# Patient Record
Sex: MALE | Race: WHITE | HISPANIC OR LATINO | Employment: UNEMPLOYED | URBAN - METROPOLITAN AREA
[De-identification: names, ages, dates, MRNs, and addresses within clinical notes are randomized per-mention and may not be internally consistent; named-entity substitution may affect disease eponyms.]

---

## 2018-10-11 ENCOUNTER — OFFICE VISIT (OUTPATIENT)
Dept: FAMILY MEDICINE CLINIC | Facility: CLINIC | Age: 58
End: 2018-10-11
Payer: COMMERCIAL

## 2018-10-11 VITALS
HEIGHT: 66 IN | DIASTOLIC BLOOD PRESSURE: 100 MMHG | WEIGHT: 146 LBS | HEART RATE: 88 BPM | SYSTOLIC BLOOD PRESSURE: 140 MMHG | OXYGEN SATURATION: 98 % | TEMPERATURE: 96.4 F | BODY MASS INDEX: 23.46 KG/M2 | RESPIRATION RATE: 16 BRPM

## 2018-10-11 DIAGNOSIS — Z23 NEED FOR INFLUENZA VACCINATION: ICD-10-CM

## 2018-10-11 DIAGNOSIS — Z23 NEED FOR VACCINATION: ICD-10-CM

## 2018-10-11 DIAGNOSIS — I10 ESSENTIAL HYPERTENSION: Primary | ICD-10-CM

## 2018-10-11 DIAGNOSIS — M54.2 CERVICALGIA: ICD-10-CM

## 2018-10-11 DIAGNOSIS — E78.2 MIXED HYPERLIPIDEMIA: ICD-10-CM

## 2018-10-11 PROCEDURE — 99214 OFFICE O/P EST MOD 30 MIN: CPT | Performed by: FAMILY MEDICINE

## 2018-10-11 PROCEDURE — 90472 IMMUNIZATION ADMIN EACH ADD: CPT | Performed by: FAMILY MEDICINE

## 2018-10-11 PROCEDURE — 3725F SCREEN DEPRESSION PERFORMED: CPT | Performed by: FAMILY MEDICINE

## 2018-10-11 PROCEDURE — 90471 IMMUNIZATION ADMIN: CPT | Performed by: FAMILY MEDICINE

## 2018-10-11 PROCEDURE — 1036F TOBACCO NON-USER: CPT | Performed by: FAMILY MEDICINE

## 2018-10-11 PROCEDURE — 90715 TDAP VACCINE 7 YRS/> IM: CPT | Performed by: FAMILY MEDICINE

## 2018-10-11 PROCEDURE — 90682 RIV4 VACC RECOMBINANT DNA IM: CPT | Performed by: FAMILY MEDICINE

## 2018-10-11 NOTE — PROGRESS NOTES
Assessment/Plan:    Cervicalgia  Clinically doing well  No symptoms of neuropathy  Advised against too frequent use of muscle relaxants and NSAID due to S/E of drowsiness and nephropathy respectively  Advices Tylenol OTC as needed for pain  Mixed hyperlipidemia  Patient will call the office as he is unsure what medication he currently takes  Will get CMP and Lipid panel  Advised to continue healthy lifestyle habits  Essential hypertension  Uncontrolled  Advised patient to call office with the name and dose of his current medicine  I will reassess whether to increase or add a second medications once this information is obtained  Will get CBC, CMP, TSH and Lipid panel and UA today  Advised patient to avoid high salt and continue with his daily exercise  Patient is agreeable  Diagnoses and all orders for this visit:    Essential hypertension  -     CBC and Platelet; Future  -     Comprehensive metabolic panel; Future  -     TSH, 3rd generation with Free T4 reflex; Future  -     UA (URINE) with reflex to Microscopic    Need for influenza vaccination  -     influenza vaccine, [id number], quadrivalent, recombinant, PF, 0 5 mL, for patients 18 yr+ (FLUBLOK)  -     TDAP VACCINE GREATER THAN OR EQUAL TO 8YO IM    Need for vaccination  -     TDAP VACCINE GREATER THAN OR EQUAL TO 8YO IM    Mixed hyperlipidemia  -     CBC and Platelet; Future  -     Lipid panel; Future    Cervicalgia          Subjective:      Patient ID: Hedda Shows is a 60 y o  male  Patient need HTN, cholesterol medication refilled  1  HTN- no home readings  Unsure of the medication he takes but its only one tablet  Reports no side effects  Denied any headaches, chest pain, SOB of lower extremety edema  Exercises daily by brisk walking with his wife and doing push-ups  Eats a healthy diet  Also complains of occasional neck pain that was treated in the past with muscle relaxants with great relief             The following portions of the patient's history were reviewed and updated as appropriate:   He  has no past medical history on file  He   Patient Active Problem List    Diagnosis Date Noted    Essential hypertension 10/11/2018    Mixed hyperlipidemia 10/11/2018    Cervicalgia 10/11/2018     He  has no past surgical history on file  His family history is not on file  He  reports that he has never smoked  He has never used smokeless tobacco  His alcohol and drug histories are not on file  No current outpatient prescriptions on file  No current facility-administered medications for this visit  No current outpatient prescriptions on file prior to visit  No current facility-administered medications on file prior to visit  He has No Known Allergies       Review of Systems   Constitutional: Negative  HENT: Negative  Eyes: Negative  Respiratory: Negative  Cardiovascular: Negative  Gastrointestinal: Negative  Endocrine: Negative  Genitourinary: Negative  Musculoskeletal: Negative  Skin: Negative  Allergic/Immunologic: Negative  Neurological: Negative  Hematological: Negative  Psychiatric/Behavioral: Negative  Objective:      /100   Pulse 88   Temp (!) 96 4 °F (35 8 °C) (Temporal)   Resp 16   Ht 5' 6" (1 676 m)   Wt 66 2 kg (146 lb)   SpO2 98%   BMI 23 57 kg/m²          Physical Exam   Constitutional: He is oriented to person, place, and time  He appears well-developed and well-nourished  No distress  HENT:   Head: Normocephalic and atraumatic  Right Ear: External ear normal    Left Ear: External ear normal    Nose: Nose normal    Mouth/Throat: Oropharynx is clear and moist  No oropharyngeal exudate  Eyes: Pupils are equal, round, and reactive to light  Conjunctivae and EOM are normal  Right eye exhibits no discharge  Left eye exhibits no discharge  No scleral icterus  Neck: Normal range of motion  Neck supple   No tracheal deviation present  No thyromegaly present  Cardiovascular: Normal rate, regular rhythm, normal heart sounds and intact distal pulses  Exam reveals no gallop and no friction rub  No murmur heard  Pulmonary/Chest: Effort normal and breath sounds normal  No stridor  No respiratory distress  He has no wheezes  He has no rales  He exhibits no tenderness  Abdominal: Soft  Bowel sounds are normal  He exhibits no distension and no mass  There is no tenderness  There is no rebound and no guarding  Musculoskeletal: Normal range of motion  He exhibits no edema, tenderness or deformity  Lymphadenopathy:     He has no cervical adenopathy  Neurological: He is alert and oriented to person, place, and time  He has normal reflexes  No cranial nerve deficit  Skin: Skin is warm and dry  No rash noted  He is not diaphoretic  No erythema  No pallor  Psychiatric: He has a normal mood and affect   His behavior is normal  Judgment and thought content normal

## 2018-10-11 NOTE — ASSESSMENT & PLAN NOTE
Patient will call the office as he is unsure what medication he currently takes  Will get CMP and Lipid panel  Advised to continue healthy lifestyle habits

## 2018-10-11 NOTE — ASSESSMENT & PLAN NOTE
Uncontrolled  Advised patient to call office with the name and dose of his current medicine  I will reassess whether to increase or add a second medications once this information is obtained  Will get CBC, CMP, TSH and Lipid panel and UA today  Advised patient to avoid high salt and continue with his daily exercise  Patient is agreeable

## 2018-10-11 NOTE — PATIENT INSTRUCTIONS

## 2018-10-11 NOTE — ASSESSMENT & PLAN NOTE
Clinically doing well  No symptoms of neuropathy  Advised against too frequent use of muscle relaxants and NSAID due to S/E of drowsiness and nephropathy respectively  Advices Tylenol OTC as needed for pain

## 2018-10-12 ENCOUNTER — TELEPHONE (OUTPATIENT)
Dept: FAMILY MEDICINE CLINIC | Facility: CLINIC | Age: 58
End: 2018-10-12

## 2018-10-12 DIAGNOSIS — E78.2 MIXED HYPERLIPIDEMIA: ICD-10-CM

## 2018-10-12 DIAGNOSIS — I10 ESSENTIAL HYPERTENSION: Primary | ICD-10-CM

## 2018-10-12 DIAGNOSIS — K29.60 REFLUX GASTRITIS: ICD-10-CM

## 2018-10-12 RX ORDER — LISINOPRIL 20 MG/1
20 TABLET ORAL DAILY
Qty: 30 TABLET | Refills: 3 | Status: SHIPPED | OUTPATIENT
Start: 2018-10-12 | End: 2022-01-14 | Stop reason: SDUPTHER

## 2018-10-12 RX ORDER — SIMVASTATIN 20 MG
20 TABLET ORAL
Qty: 30 TABLET | Refills: 3 | Status: SHIPPED | OUTPATIENT
Start: 2018-10-12 | End: 2022-01-14 | Stop reason: SDUPTHER

## 2018-10-12 RX ORDER — FAMOTIDINE 20 MG/1
20 TABLET, FILM COATED ORAL 2 TIMES DAILY
Qty: 60 TABLET | Refills: 0 | Status: SHIPPED | OUTPATIENT
Start: 2018-10-12

## 2018-10-12 NOTE — PROGRESS NOTES
I saw patient 10/11/2018  Patient was to bring his current medication regimen so I can refill  These are his current medications

## 2018-10-12 NOTE — TELEPHONE ENCOUNTER
Pt came to office to day to let us know his medications      Simvastatin 20 mg  [id number] mg  Certavite/multivitamins   Lisinopril 20 mg  notriptyline 25 mg

## 2018-10-16 ENCOUNTER — TRANSCRIBE ORDERS (OUTPATIENT)
Dept: ADMINISTRATIVE | Facility: HOSPITAL | Age: 58
End: 2018-10-16

## 2018-10-16 ENCOUNTER — APPOINTMENT (OUTPATIENT)
Dept: LAB | Facility: HOSPITAL | Age: 58
End: 2018-10-16
Payer: COMMERCIAL

## 2018-10-16 DIAGNOSIS — I10 ESSENTIAL HYPERTENSION: ICD-10-CM

## 2018-10-16 DIAGNOSIS — E78.2 MIXED HYPERLIPIDEMIA: ICD-10-CM

## 2018-10-16 LAB
ALBUMIN SERPL BCP-MCNC: 4.4 G/DL (ref 3–5.2)
ALP SERPL-CCNC: 83 U/L (ref 43–122)
ALT SERPL W P-5'-P-CCNC: 14 U/L (ref 9–52)
ANION GAP SERPL CALCULATED.3IONS-SCNC: 6 MMOL/L (ref 5–14)
AST SERPL W P-5'-P-CCNC: 22 U/L (ref 17–59)
BILIRUB SERPL-MCNC: 0.4 MG/DL
BILIRUB UR QL STRIP: NEGATIVE
BUN SERPL-MCNC: 12 MG/DL (ref 5–25)
CALCIUM SERPL-MCNC: 9.8 MG/DL (ref 8.4–10.2)
CHLORIDE SERPL-SCNC: 103 MMOL/L (ref 97–108)
CHOLEST SERPL-MCNC: 171 MG/DL
CLARITY UR: CLEAR
CO2 SERPL-SCNC: 34 MMOL/L (ref 22–30)
COLOR UR: NORMAL
CREAT SERPL-MCNC: 1.02 MG/DL (ref 0.7–1.5)
ERYTHROCYTE [DISTWIDTH] IN BLOOD BY AUTOMATED COUNT: 12.9 %
GFR SERPL CREATININE-BSD FRML MDRD: 80 ML/MIN/1.73SQ M
GLUCOSE P FAST SERPL-MCNC: 101 MG/DL (ref 70–99)
GLUCOSE UR STRIP-MCNC: NEGATIVE MG/DL
HCT VFR BLD AUTO: 46.1 % (ref 41–53)
HDLC SERPL-MCNC: 44 MG/DL (ref 40–59)
HGB BLD-MCNC: 15.1 G/DL (ref 13.5–17.5)
HGB UR QL STRIP.AUTO: NEGATIVE
KETONES UR STRIP-MCNC: NEGATIVE MG/DL
LDLC SERPL CALC-MCNC: 102 MG/DL
LEUKOCYTE ESTERASE UR QL STRIP: NEGATIVE
MCH RBC QN AUTO: 32.6 PG (ref 26–34)
MCHC RBC AUTO-ENTMCNC: 32.8 G/DL (ref 31–36)
MCV RBC AUTO: 99 FL (ref 80–100)
NITRITE UR QL STRIP: NEGATIVE
NONHDLC SERPL-MCNC: 127 MG/DL
PH UR STRIP.AUTO: 6.5 [PH] (ref 4.5–8)
PLATELET # BLD AUTO: 171 THOUSANDS/UL (ref 150–450)
PMV BLD AUTO: 9.1 FL (ref 8.9–12.7)
POTASSIUM SERPL-SCNC: 4.9 MMOL/L (ref 3.6–5)
PROT SERPL-MCNC: 7.5 G/DL (ref 5.9–8.4)
PROT UR STRIP-MCNC: NEGATIVE MG/DL
RBC # BLD AUTO: 4.64 MILLION/UL (ref 4.5–5.9)
SODIUM SERPL-SCNC: 143 MMOL/L (ref 137–147)
SP GR UR STRIP.AUTO: 1.01 (ref 1–1.04)
TRIGL SERPL-MCNC: 124 MG/DL
TSH SERPL DL<=0.05 MIU/L-ACNC: 0.81 UIU/ML (ref 0.47–4.68)
UROBILINOGEN UA: NEGATIVE MG/DL
WBC # BLD AUTO: 4.3 THOUSAND/UL (ref 4.5–11)

## 2018-10-16 PROCEDURE — 80053 COMPREHEN METABOLIC PANEL: CPT

## 2018-10-16 PROCEDURE — 80061 LIPID PANEL: CPT

## 2018-10-16 PROCEDURE — 84443 ASSAY THYROID STIM HORMONE: CPT

## 2018-10-16 PROCEDURE — 85027 COMPLETE CBC AUTOMATED: CPT

## 2018-10-16 PROCEDURE — 36415 COLL VENOUS BLD VENIPUNCTURE: CPT

## 2018-10-17 ENCOUNTER — TELEPHONE (OUTPATIENT)
Dept: FAMILY MEDICINE CLINIC | Facility: CLINIC | Age: 58
End: 2018-10-17

## 2018-10-17 NOTE — TELEPHONE ENCOUNTER
Discussed blood work results with patient  Also informed the patient that his prescriptions were sent to [facility] on [address] as this was unclear to him

## 2022-01-14 ENCOUNTER — OFFICE VISIT (OUTPATIENT)
Dept: FAMILY MEDICINE CLINIC | Facility: CLINIC | Age: 62
End: 2022-01-14

## 2022-01-14 VITALS
WEIGHT: 153.4 LBS | DIASTOLIC BLOOD PRESSURE: 100 MMHG | HEART RATE: 101 BPM | RESPIRATION RATE: 18 BRPM | BODY MASS INDEX: 24.76 KG/M2 | SYSTOLIC BLOOD PRESSURE: 138 MMHG | TEMPERATURE: 98 F | OXYGEN SATURATION: 98 %

## 2022-01-14 DIAGNOSIS — E78.2 MIXED HYPERLIPIDEMIA: ICD-10-CM

## 2022-01-14 DIAGNOSIS — I10 ESSENTIAL HYPERTENSION: ICD-10-CM

## 2022-01-14 PROCEDURE — 99203 OFFICE O/P NEW LOW 30 MIN: CPT | Performed by: FAMILY MEDICINE

## 2022-01-14 RX ORDER — SIMVASTATIN 20 MG
20 TABLET ORAL
Qty: 30 TABLET | Refills: 3 | Status: SHIPPED | OUTPATIENT
Start: 2022-01-14 | End: 2022-01-14

## 2022-01-14 RX ORDER — LISINOPRIL 20 MG/1
20 TABLET ORAL DAILY
Qty: 30 TABLET | Refills: 3 | Status: SHIPPED | OUTPATIENT
Start: 2022-01-14 | End: 2022-01-14

## 2022-01-14 RX ORDER — SIMVASTATIN 20 MG
20 TABLET ORAL
Qty: 90 TABLET | Refills: 0 | Status: SHIPPED | OUTPATIENT
Start: 2022-01-14

## 2022-01-14 RX ORDER — LISINOPRIL 20 MG/1
20 TABLET ORAL DAILY
Qty: 90 TABLET | Refills: 0 | Status: SHIPPED | OUTPATIENT
Start: 2022-01-14

## 2022-01-14 NOTE — PROGRESS NOTES
Assessment/Plan:    Essential hypertension  Assessment:   · Blood pressure at todays office visit 138/100 mmHg, slightly elevated  · Blood Pressure goal as per JNC-8 less than 140/90 mmHg,   · Currently on lisinopril 20 mg daily, compliant  Plan:   · Will continue current treatment  · BP goals reviewed with patient  · Baseline EKG ordered   · The patient was educated on the importance of medication compliance and to monitor her blood pressure at home daily in a quiet room; after resting for at least 5 minutes and to bring the logs at next visit  · Will recommend performing aerobic exercise for at least more than 3 times per week  · Will recommend sodium and fat reduction and  Increase  in fruit consumption  · Reassess BP at his next office visit  Mixed hyperlipidemia  Assessment  · Negative for Xanthomas or arcus cornealis  · Recently lipid panel:  Cholesterol   Date Value Ref Range Status   10/16/2018 171 <200 mg/dL Final     Comment:       Cholesterol:       Desirable         <200 mg/dl       Borderline         200-239 mg/dl       High              >239          ·    Triglycerides   Date Value Ref Range Status   10/16/2018 124 <150 mg/dL Final     Comment:       Triglyceride:     Normal          <150 mg/dl     Borderline High 150-199 mg/dl     High            200-499 mg/dl        Very High       >499 mg/dl    Specimen collection should occur prior to N-Acetylcysteine or Metamizole administration due to the potential for falsely depressed results  ·    HDL, Direct   Date Value Ref Range Status   10/16/2018 44 40 - 59 mg/dL Final     Comment:       HDL Cholesterol:       High    >60 mg/dL       Low     <41 mg/dL  Specimen collection should occur prior to Metamizole administration due to the potential for falsley depressed results     ·    LDL Calculated   Date Value Ref Range Status   10/16/2018 102 <130 mg/dL Final     Comment:       LDL Cholesterol:     Optimal           <100 mg/dl     Near Optimal      100-129 mg/dl     Above Optimal       Borderline High 130-159 mg/dl       High            160-189 mg/dl       Very High       >189 mg/dl         This screening LDL is a calculated result  It does not have the accuracy of the Direct Measured LDL in the monitoring of patients with hyperlipidemia and/or statin therapy  Direct Measure LDL (RXE821) must be ordered separately in these patients  ·    · Goal is cholesterol less than 200 mg/dl; LDL less than 100 mg/dl; HDL more than 40 mg/dl and triglycerides less than 159 mg/dl  · At goal   · Currently pt is on  Simvastatin 20 mg daily  · Patient states that she is compliant  Plan:  · Will continue with current treatment  · Will repeat lipid panel in 6 months  · Discussed implications of high cholesterol for cardiovascular health  · Advised patient to maintain a low-fat, low-cholesterol diet  · Encouraged consumption of more fruits and vegetables  · Advised that weight loss and exercise can help control cholesterol levels in addition to dietary changes  Diagnoses and all orders for this visit:    Essential hypertension  -     Discontinue: lisinopril (ZESTRIL) 20 mg tablet; Take 1 tablet (20 mg total) by mouth daily  -     ECG 12 lead; Future  -     lisinopril (ZESTRIL) 20 mg tablet; Take 1 tablet (20 mg total) by mouth daily    Mixed hyperlipidemia  -     Discontinue: simvastatin (ZOCOR) 20 mg tablet; Take 1 tablet (20 mg total) by mouth daily at bedtime  -     simvastatin (ZOCOR) 20 mg tablet; Take 1 tablet (20 mg total) by mouth daily at bedtime          Subjective:      Patient ID: Marianne Pollack is a 60 y o  male  This is a pleasant 61 yo M with PMH relevant for HTN, presents for follow-up of his HTN that was diagnosed 10 yrs  He was initially started on lisinopril 20 mg daily  He is fairly compliant with his medications  Currently on lisinopril 20 mg daily  He does not monitor his blood pressure at home   Overall he is feeling well  Denies fatigue, headaches, dizziness, blurred vision, nausea, palpitation, chest pain, SOB, urinary changes, weakness, bowel changes, sleep problems, or hair loss  Does not smokes, drinks alcohol socially  Denies illict drugs  Patient's medical conditions are stable unless noted otherwise above   Patient has not had any recent hospitalizations, or medical emergencies since last visit  Overall patient reports feeling well  The following portions of the patient's history were reviewed and updated as appropriate: allergies, current medications, past family history, past medical history, past social history, past surgical history and problem list     Review of Systems   Constitutional: Negative for activity change, chills, diaphoresis, fatigue, fever and unexpected weight change  Eyes: Negative for visual disturbance  Respiratory: Negative for cough, chest tightness, shortness of breath and wheezing  Cardiovascular: Negative for chest pain, palpitations and leg swelling  Gastrointestinal: Negative for abdominal distention, abdominal pain, anal bleeding, blood in stool, constipation, diarrhea, nausea, rectal pain and vomiting  Genitourinary: Negative for difficulty urinating, dysuria and flank pain  Skin: Negative for color change and pallor  Neurological: Negative for dizziness, speech difficulty and weakness  Psychiatric/Behavioral: Negative for sleep disturbance and suicidal ideas  Objective:      /100 (BP Location: Left arm, Patient Position: Sitting, Cuff Size: Standard)   Pulse 101   Temp 98 °F (36 7 °C) (Temporal)   Resp 18   Wt 69 6 kg (153 lb 6 4 oz)   SpO2 98%   BMI 24 76 kg/m²          Physical Exam  Vitals and nursing note reviewed  Constitutional:       General: He is not in acute distress  Appearance: He is well-developed  He is not ill-appearing, toxic-appearing or diaphoretic  HENT:      Head: Normocephalic and atraumatic     Eyes:      General: No scleral icterus  Extraocular Movements: Extraocular movements intact  Cardiovascular:      Rate and Rhythm: Normal rate and regular rhythm  No extrasystoles are present  Pulses:           Radial pulses are 2+ on the right side and 2+ on the left side  Heart sounds: Normal heart sounds, S1 normal and S2 normal  No murmur heard  No friction rub  No gallop  Pulmonary:      Effort: Pulmonary effort is normal  No respiratory distress  Breath sounds: Normal breath sounds and air entry  Abdominal:      General: Bowel sounds are normal       Palpations: Abdomen is soft  There is no mass  Tenderness: There is no abdominal tenderness  There is no right CVA tenderness, left CVA tenderness, guarding or rebound  Musculoskeletal:         General: No swelling, tenderness, deformity or signs of injury  Normal range of motion  Cervical back: Normal range of motion  Right lower leg: No edema  Left lower leg: No edema  Feet:      Right foot:      Skin integrity: No ulcer, skin breakdown, erythema, warmth, callus or dry skin  Left foot:      Skin integrity: No ulcer, skin breakdown, erythema, warmth, callus or dry skin  Skin:     General: Skin is warm  Findings: No rash  Neurological:      General: No focal deficit present  Mental Status: He is alert

## 2022-01-14 NOTE — ASSESSMENT & PLAN NOTE
Assessment:   · Blood pressure at todays office visit 138/100 mmHg, slightly elevated  · Blood Pressure goal as per JNC-8 less than 140/90 mmHg,   · Currently on lisinopril 20 mg daily, compliant  Plan:   · Will continue current treatment  · BP goals reviewed with patient  · Baseline EKG ordered   · The patient was educated on the importance of medication compliance and to monitor her blood pressure at home daily in a quiet room; after resting for at least 5 minutes and to bring the logs at next visit  · Will recommend performing aerobic exercise for at least more than 3 times per week  · Will recommend sodium and fat reduction and  Increase  in fruit consumption  · Reassess BP at his next office visit

## 2022-02-21 NOTE — ASSESSMENT & PLAN NOTE
Assessment  · Negative for Xanthomas or arcus cornealis  · Recently lipid panel:  Cholesterol   Date Value Ref Range Status   10/16/2018 171 <200 mg/dL Final     Comment:       Cholesterol:       Desirable         <200 mg/dl       Borderline         200-239 mg/dl       High              >239          ·    Triglycerides   Date Value Ref Range Status   10/16/2018 124 <150 mg/dL Final     Comment:       Triglyceride:     Normal          <150 mg/dl     Borderline High 150-199 mg/dl     High            200-499 mg/dl        Very High       >499 mg/dl    Specimen collection should occur prior to N-Acetylcysteine or Metamizole administration due to the potential for falsely depressed results  ·    HDL, Direct   Date Value Ref Range Status   10/16/2018 44 40 - 59 mg/dL Final     Comment:       HDL Cholesterol:       High    >60 mg/dL       Low     <41 mg/dL  Specimen collection should occur prior to Metamizole administration due to the potential for falsley depressed results  ·    LDL Calculated   Date Value Ref Range Status   10/16/2018 102 <130 mg/dL Final     Comment:       LDL Cholesterol:     Optimal           <100 mg/dl     Near Optimal      100-129 mg/dl     Above Optimal       Borderline High 130-159 mg/dl       High            160-189 mg/dl       Very High       >189 mg/dl         This screening LDL is a calculated result  It does not have the accuracy of the Direct Measured LDL in the monitoring of patients with hyperlipidemia and/or statin therapy  Direct Measure LDL (XXO188) must be ordered separately in these patients  ·    · Goal is cholesterol less than 200 mg/dl; LDL less than 100 mg/dl; HDL more than 40 mg/dl and triglycerides less than 159 mg/dl  · At goal   · Currently pt is on  Simvastatin 20 mg daily  · Patient states that she is compliant  Plan:  · Will continue with current treatment     · Will repeat lipid panel in 6 months  · Discussed implications of high cholesterol for cardiovascular health  · Advised patient to maintain a low-fat, low-cholesterol diet  · Encouraged consumption of more fruits and vegetables  · Advised that weight loss and exercise can help control cholesterol levels in addition to dietary changes

## 2023-06-22 ENCOUNTER — OFFICE VISIT (OUTPATIENT)
Dept: FAMILY MEDICINE CLINIC | Facility: CLINIC | Age: 63
End: 2023-06-22

## 2023-06-22 VITALS
DIASTOLIC BLOOD PRESSURE: 78 MMHG | HEART RATE: 94 BPM | TEMPERATURE: 98.3 F | WEIGHT: 152.2 LBS | RESPIRATION RATE: 19 BRPM | HEIGHT: 65 IN | BODY MASS INDEX: 25.36 KG/M2 | SYSTOLIC BLOOD PRESSURE: 130 MMHG | OXYGEN SATURATION: 97 %

## 2023-06-22 DIAGNOSIS — E78.2 MIXED HYPERLIPIDEMIA: Primary | ICD-10-CM

## 2023-06-22 DIAGNOSIS — Z12.5 SCREENING FOR PROSTATE CANCER: ICD-10-CM

## 2023-06-22 DIAGNOSIS — R10.819 SUPRAPUBIC TENDERNESS: ICD-10-CM

## 2023-06-22 DIAGNOSIS — I10 ESSENTIAL HYPERTENSION: ICD-10-CM

## 2023-06-22 DIAGNOSIS — Z12.11 SCREEN FOR COLON CANCER: ICD-10-CM

## 2023-06-22 PROBLEM — R39.9 URINARY TRACT INFECTION SYMPTOMS: Status: ACTIVE | Noted: 2023-06-22

## 2023-06-22 LAB
SL AMB  POCT GLUCOSE, UA: NORMAL
SL AMB LEUKOCYTE ESTERASE,UA: NORMAL
SL AMB POCT BILIRUBIN,UA: NORMAL
SL AMB POCT BLOOD,UA: NORMAL
SL AMB POCT CLARITY,UA: CLEAR
SL AMB POCT COLOR,UA: NORMAL
SL AMB POCT KETONES,UA: NORMAL
SL AMB POCT NITRITE,UA: NORMAL
SL AMB POCT PH,UA: 5
SL AMB POCT SPECIFIC GRAVITY,UA: 1.01
SL AMB POCT URINE PROTEIN: NORMAL
SL AMB POCT UROBILINOGEN: NORMAL

## 2023-06-22 PROCEDURE — 99214 OFFICE O/P EST MOD 30 MIN: CPT | Performed by: FAMILY MEDICINE

## 2023-06-22 PROCEDURE — 81002 URINALYSIS NONAUTO W/O SCOPE: CPT | Performed by: FAMILY MEDICINE

## 2023-06-22 PROCEDURE — 87086 URINE CULTURE/COLONY COUNT: CPT | Performed by: STUDENT IN AN ORGANIZED HEALTH CARE EDUCATION/TRAINING PROGRAM

## 2023-06-22 RX ORDER — LISINOPRIL 20 MG/1
20 TABLET ORAL DAILY
Qty: 30 TABLET | Refills: 5 | Status: SHIPPED | OUTPATIENT
Start: 2023-06-22

## 2023-06-22 NOTE — ASSESSMENT & PLAN NOTE
Assessment  • Negative for Xanthomas or arcus cornealis  • Recently lipid panel:          • Cholesterol   Date Value Ref Range Status   10/16/2018 171 <200 mg/dL Final       Comment:          Cholesterol:       Desirable         <200 mg/dl       Borderline         200-239 mg/dl       High              >239            ?            • Triglycerides   Date Value Ref Range Status   10/16/2018 124 <150 mg/dL Final       Comment:          Triglyceride:     Normal          <150 mg/dl     Borderline High 150-199 mg/dl     High            200-499 mg/dl        Very High       >499 mg/dl     Specimen collection should occur prior to N-Acetylcysteine or Metamizole administration due to the potential for falsely depressed results  ?            • HDL, Direct   Date Value Ref Range Status   10/16/2018 44 40 - 59 mg/dL Final       Comment:          HDL Cholesterol:       High    >60 mg/dL       Low     <41 mg/dL  Specimen collection should occur prior to Metamizole administration due to the potential for falsley depressed results  ?            • LDL Calculated   Date Value Ref Range Status   10/16/2018 102 <130 mg/dL Final       Comment:          LDL Cholesterol:     Optimal           <100 mg/dl     Near Optimal      100-129 mg/dl     Above Optimal       Borderline High 130-159 mg/dl       High            160-189 mg/dl       Very High       >189 mg/dl           This screening LDL is a calculated result  It does not have the accuracy of the Direct Measured LDL in the monitoring of patients with hyperlipidemia and/or statin therapy  Direct Measure LDL (GSX573) must be ordered separately in these patients  ?    • Goal is cholesterol less than 200 mg/dl; LDL less than 100 mg/dl; HDL more than 40 mg/dl and triglycerides less than 159 mg/dl  • At goal   • Currently pt is on  Simvastatin 20 mg daily  • Patient states that she is compliant       Plan:  • Will continue with current treatment     • Will repeat lipid panel in 6 months  • Discussed implications of high cholesterol for cardiovascular health  • Advised patient to maintain a low-fat, low-cholesterol diet  • Encouraged consumption of more fruits and vegetables  • Advised that weight loss and exercise can help control cholesterol levels in addition to dietary changes

## 2023-06-22 NOTE — PROGRESS NOTES
Name: Brayan Lujan      : 1960      MRN: [id number]  Encounter Provider: Priscilla Mejia MD  Encounter Date: 2023   Encounter department: [address] [place] [address]  Mixed hyperlipidemia  Assessment & Plan:  Assessment  • Negative for Xanthomas or arcus cornealis  • Recently lipid panel:          • Cholesterol   Date Value Ref Range Status   10/16/2018 171 <200 mg/dL Final       Comment:          Cholesterol:       Desirable         <200 mg/dl       Borderline         200-239 mg/dl       High              >239            ?            • Triglycerides   Date Value Ref Range Status   10/16/2018 124 <150 mg/dL Final       Comment:          Triglyceride:     Normal          <150 mg/dl     Borderline High 150-199 mg/dl     High            200-499 mg/dl        Very High       >499 mg/dl     Specimen collection should occur prior to N-Acetylcysteine or Metamizole administration due to the potential for falsely depressed results  ?            • HDL, Direct   Date Value Ref Range Status   10/16/2018 44 40 - 59 mg/dL Final       Comment:          HDL Cholesterol:       High    >60 mg/dL       Low     <41 mg/dL  Specimen collection should occur prior to Metamizole administration due to the potential for falsley depressed results  ?            • LDL Calculated   Date Value Ref Range Status   10/16/2018 102 <130 mg/dL Final       Comment:          LDL Cholesterol:     Optimal           <100 mg/dl     Near Optimal      100-129 mg/dl     Above Optimal       Borderline High 130-159 mg/dl       High            160-189 mg/dl       Very High       >189 mg/dl           This screening LDL is a calculated result  It does not have the accuracy of the Direct Measured LDL in the monitoring of patients with hyperlipidemia and/or statin therapy  Direct Measure LDL (KAA475) must be ordered separately in these patients     ?    • Goal is cholesterol less than 200 mg/dl; LDL less than 100 mg/dl; HDL more than 40 mg/dl and triglycerides less than 159 mg/dl  • At goal   • Currently pt is on  Simvastatin 20 mg daily  • Patient states that she is compliant       Plan:  • Will continue with current treatment  • Will repeat lipid panel in 6 months  • Discussed implications of high cholesterol for cardiovascular health  • Advised patient to maintain a low-fat, low-cholesterol diet  • Encouraged consumption of more fruits and vegetables  • Advised that weight loss and exercise can help control cholesterol levels in addition to dietary changes  2  Essential hypertension  Assessment & Plan:  Assessment:   • Blood pressure at today’s office visit 130/78 mmHg, controlled  • Blood Pressure goal as per JNC-8 less than 140/90 mmHg,   • Currently on lisinopril 20 mg daily, compliant      Plan:   • Will continue current treatment  • BP goals reviewed with patient  • Baseline EKG ordered   • The patient was educated on the importance of medication compliance and to monitor her blood pressure at home daily in a quiet room; after resting for at least 5 minutes and to bring the logs at next visit  • Will recommend performing aerobic exercise for at least more than 3 times per week  • Will recommend sodium and fat reduction and  Increase  in fruit consumption  • Reassess BP at his next office visit  Orders:  -     lisinopril (ZESTRIL) 20 mg tablet; Take 1 tablet (20 mg total) by mouth daily    3  Screen for colon cancer  -     Ambulatory Referral to General Surgery; Future    4  Suprapubic tenderness  Assessment & Plan:  Assessment   -Patient complaining of mild suprapubic tenderness of unknown origin   -Reports normal urination, denied hematuria, hesitancy, flank pain    -Patient does have hx of nephrolithiasis  -Reports hx of nephrolithotomy in two occassions  -GFR in 2018 was 80, unclear of kidney function    -Blood pressure controlled         Plan  -Will order cultures, defer antibiotics at this visit and once culture is back treat as needed  -PSA, CBC, CMP ordered   -Abdominal ultrasound ordered       Orders:  -     POCT urine dip  -     Urine culture; Future  -     US abdomen complete with doppler; Future; Expected date: 06/22/2023  -     Urine culture  -     Comprehensive metabolic panel; Future  -     CBC and differential; Future    5  Screening for prostate cancer  -     PSA, total and free; Future      Depression Screening and Follow-up Plan: Patient was screened for depression during today's encounter  They screened negative with a PHQ-2 score of 0  Subjective      It was a pleasure to see Clifford Bro, a 58 y o   male who presents for Hypertension and management of his chronic medical condition(s)  Todays complaint: suprapubic tenderness, has been vianey on for couple of months, reports fetid urine smell, frequency, and change in urine color  Patient reports that pain comes, no radiation, no alleviators, aggravated by abdominal contraction  Patient reports previous hx of kidney surgery due to stones 2-3 year ago in [facility](1st time) and in [facility] the second time  Patient denies fever, chills, unintentional weight loss, hematuria, discharge, flank pain, nausea, vomits  No medications tried  No hx of UTI's as far as he remembers  Patient's medical conditions are stable unless noted otherwise above   Patient has not had any recent hospitalizations, or medical emergencies since last visit  Patient reports compliance with his medications, denies side effects  Overall patient reports feeling well with no further complaint(s) other than what is mentioned  Denies hx of tobacco, alcohol and illicit drug consumption  Review of Systems   Constitutional: Negative for activity change, appetite change, fatigue and fever     HENT: Negative for congestion, facial swelling, mouth sores, postnasal drip, rhinorrhea, sinus "pressure, sneezing, sore throat, trouble swallowing and voice change  Eyes: Negative for itching and visual disturbance  Respiratory: Negative for apnea, cough, choking, chest tightness, shortness of breath and wheezing  Cardiovascular: Negative for chest pain, palpitations and leg swelling  Gastrointestinal: Negative for abdominal distention, abdominal pain, blood in stool, constipation and diarrhea  Suprapubic tenderness  Genitourinary: Negative for difficulty urinating and dysuria  Musculoskeletal: Negative for arthralgias and myalgias  Skin: Negative for pallor and rash  Allergic/Immunologic: Negative for environmental allergies and food allergies  Neurological: Negative for dizziness, syncope, weakness, light-headedness, numbness and headaches  Psychiatric/Behavioral: Negative for agitation, behavioral problems, confusion, self-injury, sleep disturbance and suicidal ideas  Current Outpatient Medications on File Prior to Visit   Medication Sig   • famotidine (PEPCID) 20 mg tablet Take 1 tablet (20 mg total) by mouth 2 (two) times a day   • simvastatin (ZOCOR) 20 mg tablet Take 1 tablet (20 mg total) by mouth daily at bedtime       Objective     /78 (BP Location: Left arm, Patient Position: Sitting, Cuff Size: Standard)   Pulse 94   Temp 98 3 °F (36 8 °C) (Temporal)   Resp 19   Ht 5' 5\" (1 651 m)   Wt 69 kg (152 lb 3 2 oz)   SpO2 97%   BMI 25 33 kg/m²     Physical Exam  Vitals and nursing note reviewed  Constitutional:       General: He is not in acute distress  Appearance: He is well-developed  He is not ill-appearing, toxic-appearing or diaphoretic  HENT:      Head: Normocephalic and atraumatic  Eyes:      General: No scleral icterus  Extraocular Movements: Extraocular movements intact  Cardiovascular:      Rate and Rhythm: Normal rate and regular rhythm  No extrasystoles are present       Pulses:           Radial pulses are 2+ on the right side " and 2+ on the left side  Heart sounds: Normal heart sounds, S1 normal and S2 normal  No murmur heard  No friction rub  No gallop  Pulmonary:      Effort: Pulmonary effort is normal  No respiratory distress  Breath sounds: Normal breath sounds and air entry  Abdominal:      General: Bowel sounds are normal       Palpations: Abdomen is soft  There is no mass  Tenderness: There is abdominal tenderness in the suprapubic area  There is no right CVA tenderness, left CVA tenderness, guarding or rebound  Musculoskeletal:         General: No swelling, tenderness, deformity or signs of injury  Normal range of motion  Cervical back: Normal range of motion  Right lower leg: No edema  Left lower leg: No edema  Feet:      Right foot:      Skin integrity: No ulcer, skin breakdown, erythema, warmth, callus or dry skin  Left foot:      Skin integrity: No ulcer, skin breakdown, erythema, warmth, callus or dry skin  Skin:     General: Skin is warm  Findings: No rash  Neurological:      General: No focal deficit present  Mental Status: He is alert         Yash Wetzel MD

## 2023-06-22 NOTE — ASSESSMENT & PLAN NOTE
Assessment:   • Blood pressure at today’s office visit 130/78 mmHg, controlled  • Blood Pressure goal as per JNC-8 less than 140/90 mmHg,   • Currently on lisinopril 20 mg daily, compliant      Plan:   • Will continue current treatment  • BP goals reviewed with patient  • Baseline EKG ordered   • The patient was educated on the importance of medication compliance and to monitor her blood pressure at home daily in a quiet room; after resting for at least 5 minutes and to bring the logs at next visit  • Will recommend performing aerobic exercise for at least more than 3 times per week  • Will recommend sodium and fat reduction and  Increase  in fruit consumption  • Reassess BP at his next office visit

## 2023-06-23 LAB — BACTERIA UR CULT: NORMAL

## 2023-06-26 NOTE — ASSESSMENT & PLAN NOTE
Assessment   -Patient complaining of mild suprapubic tenderness of unknown origin   -Reports normal urination, denied hematuria, hesitancy, flank pain    -Patient does have hx of nephrolithiasis  -Reports hx of nephrolithotomy in two occassions  -GFR in 2018 was 80, unclear of kidney function    -Blood pressure controlled  Plan  -Will order cultures, defer antibiotics at this visit and once culture is back treat as needed     -PSA, CBC, CMP ordered   -Abdominal ultrasound ordered

## 2023-06-29 ENCOUNTER — LAB (OUTPATIENT)
Dept: LAB | Facility: CLINIC | Age: 63
End: 2023-06-29
Payer: MEDICARE

## 2023-06-29 DIAGNOSIS — Z12.5 SCREENING FOR PROSTATE CANCER: ICD-10-CM

## 2023-06-29 DIAGNOSIS — R10.819 SUPRAPUBIC TENDERNESS: ICD-10-CM

## 2023-06-29 LAB
ALBUMIN SERPL BCP-MCNC: 3.6 G/DL (ref 3.5–5)
ALP SERPL-CCNC: 86 U/L (ref 46–116)
ALT SERPL W P-5'-P-CCNC: 21 U/L (ref 12–78)
ANION GAP SERPL CALCULATED.3IONS-SCNC: 3 MMOL/L
AST SERPL W P-5'-P-CCNC: 18 U/L (ref 5–45)
BASOPHILS # BLD AUTO: 0.03 THOUSANDS/ÂΜL (ref 0–0.1)
BASOPHILS NFR BLD AUTO: 0 % (ref 0–1)
BILIRUB SERPL-MCNC: 0.48 MG/DL (ref 0.2–1)
BUN SERPL-MCNC: 12 MG/DL (ref 5–25)
CALCIUM SERPL-MCNC: 9.6 MG/DL (ref 8.3–10.1)
CHLORIDE SERPL-SCNC: 105 MMOL/L (ref 96–108)
CO2 SERPL-SCNC: 31 MMOL/L (ref 21–32)
CREAT SERPL-MCNC: 1.09 MG/DL (ref 0.6–1.3)
EOSINOPHIL # BLD AUTO: 0.11 THOUSAND/ÂΜL (ref 0–0.61)
EOSINOPHIL NFR BLD AUTO: 2 % (ref 0–6)
ERYTHROCYTE [DISTWIDTH] IN BLOOD BY AUTOMATED COUNT: 12.9 % (ref 11.6–15.1)
GFR SERPL CREATININE-BSD FRML MDRD: 71 ML/MIN/1.73SQ M
GLUCOSE P FAST SERPL-MCNC: 91 MG/DL (ref 65–99)
HCT VFR BLD AUTO: 44.8 % (ref 36.5–49.3)
HGB BLD-MCNC: 14.1 G/DL (ref 12–17)
IMM GRANULOCYTES # BLD AUTO: 0.03 THOUSAND/UL (ref 0–0.2)
IMM GRANULOCYTES NFR BLD AUTO: 0 % (ref 0–2)
LYMPHOCYTES # BLD AUTO: 1.67 THOUSANDS/ÂΜL (ref 0.6–4.47)
LYMPHOCYTES NFR BLD AUTO: 24 % (ref 14–44)
MCH RBC QN AUTO: 32 PG (ref 26.8–34.3)
MCHC RBC AUTO-ENTMCNC: 31.5 G/DL (ref 31.4–37.4)
MCV RBC AUTO: 102 FL (ref 82–98)
MONOCYTES # BLD AUTO: 0.61 THOUSAND/ÂΜL (ref 0.17–1.22)
MONOCYTES NFR BLD AUTO: 9 % (ref 4–12)
NEUTROPHILS # BLD AUTO: 4.62 THOUSANDS/ÂΜL (ref 1.85–7.62)
NEUTS SEG NFR BLD AUTO: 65 % (ref 43–75)
NRBC BLD AUTO-RTO: 0 /100 WBCS
PLATELET # BLD AUTO: 212 THOUSANDS/UL (ref 149–390)
PMV BLD AUTO: 10.8 FL (ref 8.9–12.7)
POTASSIUM SERPL-SCNC: 4.7 MMOL/L (ref 3.5–5.3)
PROT SERPL-MCNC: 7.3 G/DL (ref 6.4–8.4)
RBC # BLD AUTO: 4.4 MILLION/UL (ref 3.88–5.62)
SODIUM SERPL-SCNC: 139 MMOL/L (ref 135–147)
WBC # BLD AUTO: 7.07 THOUSAND/UL (ref 4.31–10.16)

## 2023-06-29 PROCEDURE — 84153 ASSAY OF PSA TOTAL: CPT

## 2023-06-29 PROCEDURE — 36415 COLL VENOUS BLD VENIPUNCTURE: CPT

## 2023-06-29 PROCEDURE — 80053 COMPREHEN METABOLIC PANEL: CPT

## 2023-06-29 PROCEDURE — 84154 ASSAY OF PSA FREE: CPT

## 2023-06-29 PROCEDURE — 85025 COMPLETE CBC W/AUTO DIFF WBC: CPT

## 2023-07-01 DIAGNOSIS — D53.9 MACROCYTIC ANEMIA: ICD-10-CM

## 2023-07-01 DIAGNOSIS — N18.2 STAGE 2 CHRONIC KIDNEY DISEASE: Primary | ICD-10-CM

## 2023-07-01 LAB
PSA FREE MFR SERPL: 35 %
PSA FREE SERPL-MCNC: 0.63 NG/ML
PSA SERPL-MCNC: 1.8 NG/ML (ref 0–4)

## 2023-07-01 NOTE — RESULT ENCOUNTER NOTE
Greetings, please inform patient that his recent labs showed:    -Slightly decreased in his kidney function, previously seen on his blood work back in 2018 compatible with chronic kidney disease  I will order blood work for him to repeat in 3 months to reassess function  -CBC: although his hemoglobin is wnl, dx of macrocytic anemia due to with slightly increased main corpuscular volume  In order to differentiate megaloblastic from nonmegaloblastic macrocytic anemia, I will order folate and B12 levels for him to perform next week, as well as crea/albumin ratio, and CBC with diff for him to perform in 3 months with previously stated CMP  Recommendations:   - Recommend to avoid use of NSAIDs, Caution advised with regards to exposure to IV contrast dye    -Avoid alcohol consumption which is a contributor to megaloblastic anemias    -Keep hydrated with at least 8 oz/glasses of water on daily basis  Further discussion at next office visit, advice patient to schedule visit if he doesn't have one in 2 months       All the best,     Kin Mcrae MD  07/01/23  3:04 PM

## 2023-07-01 NOTE — ASSESSMENT & PLAN NOTE
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

## 2023-07-07 ENCOUNTER — HOSPITAL ENCOUNTER (OUTPATIENT)
Dept: ULTRASOUND IMAGING | Facility: HOSPITAL | Age: 63
End: 2023-07-07
Payer: MEDICARE

## 2023-07-07 DIAGNOSIS — R10.819 SUPRAPUBIC TENDERNESS: ICD-10-CM

## 2023-07-07 PROCEDURE — 76770 US EXAM ABDO BACK WALL COMP: CPT

## 2023-07-12 ENCOUNTER — CONSULT (OUTPATIENT)
Dept: SURGERY | Facility: CLINIC | Age: 63
End: 2023-07-12
Payer: MEDICARE

## 2023-07-12 VITALS
OXYGEN SATURATION: 98 % | BODY MASS INDEX: 25.33 KG/M2 | WEIGHT: 152 LBS | HEIGHT: 65 IN | TEMPERATURE: 97.9 F | HEART RATE: 87 BPM | SYSTOLIC BLOOD PRESSURE: 128 MMHG | DIASTOLIC BLOOD PRESSURE: 76 MMHG

## 2023-07-12 DIAGNOSIS — Z12.11 SCREEN FOR COLON CANCER: Primary | ICD-10-CM

## 2023-07-12 PROCEDURE — 99244 OFF/OP CNSLTJ NEW/EST MOD 40: CPT | Performed by: SURGERY

## 2023-07-12 RX ORDER — LISINOPRIL 10 MG/1
10 TABLET ORAL DAILY
COMMUNITY

## 2023-07-12 RX ORDER — BISACODYL 5 MG/1
5 TABLET, DELAYED RELEASE ORAL SEE ADMIN INSTRUCTIONS
Qty: 4 TABLET | Refills: 0 | Status: SHIPPED | OUTPATIENT
Start: 2023-07-12

## 2023-07-12 RX ORDER — POLYETHYLENE GLYCOL 3350 17 G/17G
238 POWDER, FOR SOLUTION ORAL SEE ADMIN INSTRUCTIONS
Qty: 238 G | Refills: 0 | Status: SHIPPED | OUTPATIENT
Start: 2023-07-12

## 2023-07-12 NOTE — PROGRESS NOTES
Assessment/Plan:  Discussed risks and benefits of colonoscopy including low risk of bleeding if polypectomy performed, abdominal discomfort/bloating and very small risk of colon perforation. Explained bowel prep in detail and gave instructions detailing appropriate pre-procedure diet and medication use. Prescription for bowel prep will be sent to the pharmacy. Procedure will be scheduled at earliest convenience. No problem-specific Assessment & Plan notes found for this encounter. Diagnoses and all orders for this visit:    Screen for colon cancer  -     Ambulatory Referral to General Surgery  -     polyethylene glycol (GLYCOLAX) 17 GM/SCOOP powder; Take 238 g by mouth see administration instructions Mix 238 g with 64 oz of clear liquid. Drink half starting at noon on the day prior to colonoscopy. Drink remaining half 6 hours prior to procedure on day of colonoscopy. -     bisacodyl (DULCOLAX) 5 mg EC tablet; Take 1 tablet (5 mg total) by mouth see administration instructions Take 2 tablets at 12:00 p.m. on day prior to colonoscopy. Take 2 tablets at 4:00 p.m. on day prior to colonoscopy    Other orders  -     lisinopril (ZESTRIL) 10 mg tablet; Take 10 mg by mouth daily          Subjective:      Patient ID: Isatu Stockton is a 58 y.o. male. He presents for screening colonoscopy. He has had colonoscopy in the past he thinks around 5 years ago. He was told that he had some polyps removed. He denies any abdominal pain or rectal pain, no blood in his stool no family history of colon cancer. The following portions of the patient's history were reviewed and updated as appropriate:   He  has no past medical history on file.   He   Patient Active Problem List    Diagnosis Date Noted   • Macrocytic anemia 07/01/2023   • Stage 2 chronic kidney disease 07/01/2023   • Suprapubic tenderness 06/22/2023   • Essential hypertension 10/11/2018   • Mixed hyperlipidemia 10/11/2018   • Cervicalgia 10/11/2018 He  has no past surgical history on file. His family history is not on file. He  reports that he has never smoked. He has never used smokeless tobacco. No history on file for alcohol use and drug use. Current Outpatient Medications   Medication Sig Dispense Refill   • famotidine (PEPCID) 20 mg tablet Take 1 tablet (20 mg total) by mouth 2 (two) times a day 60 tablet 0   • lisinopril (ZESTRIL) 10 mg tablet Take 10 mg by mouth daily     • simvastatin (ZOCOR) 20 mg tablet Take 1 tablet (20 mg total) by mouth daily at bedtime 90 tablet 0   • lisinopril (ZESTRIL) 20 mg tablet Take 1 tablet (20 mg total) by mouth daily (Patient not taking: Reported on 7/12/2023) 30 tablet 5     No current facility-administered medications for this visit. He has No Known Allergies. .    Review of Systems   Gastrointestinal: Negative for abdominal pain, anal bleeding, blood in stool, constipation, diarrhea and rectal pain. Musculoskeletal: Positive for neck pain. All other systems reviewed and are negative. Objective:      /76 (BP Location: Left arm, Patient Position: Sitting, Cuff Size: Standard)   Pulse 87   Temp 97.9 °F (36.6 °C) (Tympanic)   Ht 5' 5" (1.651 m)   Wt 68.9 kg (152 lb)   SpO2 98%   BMI 25.29 kg/m²          Physical Exam  Vitals reviewed. Constitutional:       General: He is not in acute distress. Appearance: He is normal weight. HENT:      Head: Normocephalic and atraumatic. Nose: Nose normal.      Mouth/Throat:      Mouth: Mucous membranes are moist.      Pharynx: Oropharynx is clear. Eyes:      Extraocular Movements: Extraocular movements intact. Conjunctiva/sclera: Conjunctivae normal.      Pupils: Pupils are equal, round, and reactive to light. Cardiovascular:      Rate and Rhythm: Normal rate and regular rhythm. Heart sounds: Normal heart sounds. Pulmonary:      Effort: Pulmonary effort is normal. No respiratory distress.       Breath sounds: Normal breath sounds. Abdominal:      General: Abdomen is flat. There is no distension. Palpations: Abdomen is soft. Tenderness: There is no abdominal tenderness. Musculoskeletal:         General: Normal range of motion. Cervical back: Normal range of motion and neck supple. Skin:     General: Skin is warm and dry. Neurological:      General: No focal deficit present. Mental Status: He is alert and oriented to person, place, and time.

## 2023-08-04 ENCOUNTER — OFFICE VISIT (OUTPATIENT)
Dept: FAMILY MEDICINE CLINIC | Facility: CLINIC | Age: 63
End: 2023-08-04

## 2023-08-04 VITALS
BODY MASS INDEX: 25.83 KG/M2 | HEIGHT: 65 IN | SYSTOLIC BLOOD PRESSURE: 102 MMHG | TEMPERATURE: 96 F | DIASTOLIC BLOOD PRESSURE: 60 MMHG | RESPIRATION RATE: 16 BRPM | HEART RATE: 76 BPM | OXYGEN SATURATION: 97 % | WEIGHT: 155 LBS

## 2023-08-04 DIAGNOSIS — H60.332 ACUTE SWIMMER'S EAR OF LEFT SIDE: Primary | ICD-10-CM

## 2023-08-04 PROCEDURE — 99213 OFFICE O/P EST LOW 20 MIN: CPT

## 2023-08-04 NOTE — ASSESSMENT & PLAN NOTE
Patient reports swimming in pool a few days ago, yesterday started experiencing left sided hearing loss, and pain.    Start ciprofloxacin-hydrocortisone tid for 7 days   Follow up if symptoms worsen or do not improve

## 2023-08-04 NOTE — PROGRESS NOTES
Name: Dieudonne Garcia      : 1960      MRN: [id number]  Encounter Provider: LYDIA Mijares  Encounter Date: 2023   Encounter department: [address] [facility]     1. Acute swimmer's ear of left side  Assessment & Plan:  Patient reports swimming in pool a few days ago, yesterday started experiencing left sided hearing loss, and pain. Start ciprofloxacin-hydrocortisone tid for 7 days   Follow up if symptoms worsen or do not improve    Orders:  -     ciprofloxacin-hydrocortisone (CIPRO HC OTIC) otic suspension; Administer 3 drops into the left ear 2 (two) times a day for 7 days         Subjective      Clifford Blum 58 y.o. male  has no past medical history on file. Presenting today for evaluation of left sided ear pain and hearing loss. Patient reports symptoms started yesterday, a few days ago was swimming. Denies fatigue, headaches, dizziness, blurred vision, nausea, palpitation, chest pain, SOB, urinary changes, weakness, bowel changes, sleep problems,  sick contacts, red flag signs,  or recent travel.   Overall patient reports feeling well.  Patient has no further complaints other than what is mentioned in the ROS. Earache   There is pain in the left ear. This is a new problem. The current episode started yesterday. The problem occurs constantly. The problem has been waxing and waning. There has been no fever. The pain is at a severity of 5/10. The pain is moderate. Associated symptoms include hearing loss. Pertinent negatives include no abdominal pain, coughing, ear discharge, headaches, rash, rhinorrhea, sore throat or vomiting. He has tried nothing for the symptoms. The treatment provided no relief. Review of Systems   Constitutional: Negative for chills and fever. HENT: Positive for ear pain and hearing loss. Negative for ear discharge, rhinorrhea and sore throat. Eyes: Negative for pain and visual disturbance.    Respiratory: Negative for cough and shortness of breath. Cardiovascular: Negative for chest pain and palpitations. Gastrointestinal: Negative for abdominal pain and vomiting. Genitourinary: Negative for dysuria and hematuria. Musculoskeletal: Negative for arthralgias and back pain. Skin: Negative for color change and rash. Neurological: Negative for seizures, syncope and headaches. All other systems reviewed and are negative. Current Outpatient Medications on File Prior to Visit   Medication Sig   • famotidine (PEPCID) 20 mg tablet Take 1 tablet (20 mg total) by mouth 2 (two) times a day   • lisinopril (ZESTRIL) 20 mg tablet Take 1 tablet (20 mg total) by mouth daily (Patient not taking: Reported on 7/12/2023)   • simvastatin (ZOCOR) 20 mg tablet Take 1 tablet (20 mg total) by mouth daily at bedtime   • [DISCONTINUED] bisacodyl (DULCOLAX) 5 mg EC tablet Take 1 tablet (5 mg total) by mouth see administration instructions Take 2 tablets at 12:00 p.m. on day prior to colonoscopy. Take 2 tablets at 4:00 p.m. on day prior to colonoscopy   • [DISCONTINUED] lisinopril (ZESTRIL) 10 mg tablet Take 10 mg by mouth daily   • [DISCONTINUED] polyethylene glycol (GLYCOLAX) 17 GM/SCOOP powder Take 238 g by mouth see administration instructions Mix 238 g with 64 oz of clear liquid. Drink half starting at noon on the day prior to colonoscopy. Drink remaining half 6 hours prior to procedure on day of colonoscopy. Objective     /60 (BP Location: Left arm, Patient Position: Sitting, Cuff Size: Standard)   Pulse 76   Temp (!) 96 °F (35.6 °C) (Temporal)   Resp 16   Ht 5' 5" (1.651 m)   Wt 70.3 kg (155 lb)   SpO2 97%   BMI 25.79 kg/m²     Physical Exam  Vitals and nursing note reviewed. Constitutional:       General: He is not in acute distress. Appearance: Normal appearance. He is not ill-appearing. HENT:      Head: Normocephalic and atraumatic.       Right Ear: Tympanic membrane, ear canal and external ear normal.      Left Ear: Tenderness (tenderness at the traggus ) present. A middle ear effusion is present. Nose: Nose normal.      Mouth/Throat:      Mouth: Mucous membranes are moist.   Eyes:      General:         Right eye: No discharge. Left eye: No discharge. Pupils: Pupils are equal, round, and reactive to light. Cardiovascular:      Pulses: Normal pulses. Heart sounds: Normal heart sounds. Pulmonary:      Effort: Pulmonary effort is normal. No respiratory distress. Breath sounds: Normal breath sounds. No wheezing. Abdominal:      General: Bowel sounds are normal.      Palpations: Abdomen is soft. Tenderness: There is no abdominal tenderness. There is no right CVA tenderness or left CVA tenderness. Musculoskeletal:         General: Normal range of motion. Skin:     General: Skin is warm and dry. Neurological:      General: No focal deficit present. Mental Status: He is alert and oriented to person, place, and time.        Azucena Comas, CRNP

## 2023-08-07 ENCOUNTER — OFFICE VISIT (OUTPATIENT)
Dept: FAMILY MEDICINE CLINIC | Facility: CLINIC | Age: 63
End: 2023-08-07

## 2023-08-07 VITALS
DIASTOLIC BLOOD PRESSURE: 78 MMHG | SYSTOLIC BLOOD PRESSURE: 118 MMHG | RESPIRATION RATE: 16 BRPM | OXYGEN SATURATION: 98 % | HEIGHT: 65 IN | BODY MASS INDEX: 25.66 KG/M2 | HEART RATE: 76 BPM | WEIGHT: 154 LBS | TEMPERATURE: 96.1 F

## 2023-08-07 DIAGNOSIS — H61.23 BILATERAL IMPACTED CERUMEN: ICD-10-CM

## 2023-08-07 DIAGNOSIS — H92.02 LEFT EAR PAIN: Primary | ICD-10-CM

## 2023-08-07 PROCEDURE — 99213 OFFICE O/P EST LOW 20 MIN: CPT | Performed by: INTERNAL MEDICINE

## 2023-08-07 RX ORDER — IBUPROFEN 400 MG/1
400 TABLET ORAL EVERY 6 HOURS PRN
Qty: 30 TABLET | Refills: 0 | Status: SHIPPED | OUTPATIENT
Start: 2023-08-07

## 2023-08-07 NOTE — PATIENT INSTRUCTIONS
Place cerumen impaction patient instructions here. Earache   DISCHARGE INSTRUCTIONS:   Return to the emergency department if:   You have a severe earache. You have ear pain with itching, hearing loss, dizziness, a feeling of fullness in your ear, or ringing in your ears. Call your doctor if:   Your ear pain worsens or does not go away with treatment. You have drainage from your ear. You have a fever. Your outer ear becomes red, swollen, and warm. You have questions or concerns about your condition or care. Medicines: You may need any of the following:  Acetaminophen  decreases pain and fever. It is available without a doctor's order. Ask how much to take and how often to take it. Follow directions. Read the labels of all other medicines you are using to see if they also contain acetaminophen, or ask your doctor or pharmacist. Acetaminophen can cause liver damage if not taken correctly. NSAIDs , such as ibuprofen, help decrease swelling, pain, and fever. This medicine is available with or without a doctor's order. NSAIDs can cause stomach bleeding or kidney problems in certain people. If you take blood thinner medicine, always ask your healthcare provider if NSAIDs are safe for you. Always read the medicine label and follow directions. Do not give aspirin to children younger than 18 years. Your child could develop Reye syndrome if he or she has the flu or a fever and takes aspirin. Reye syndrome can cause life-threatening brain and liver damage. Check your child's medicine labels for aspirin or salicylates. Take your medicine as directed. Contact your healthcare provider if you think your medicine is not helping or if you have side effects. Tell your provider if you are allergic to any medicine. Keep a list of the medicines, vitamins, and herbs you take. Include the amounts, and when and why you take them. Bring the list or the pill bottles to follow-up visits.  Carry your medicine list with you in case of an emergency. Follow up with your doctor as directed:  Write down your questions so you remember to ask them during your visits. © Copyright [employer] 2022 Information is for End User's use only and may not be sold, redistributed or otherwise used for commercial purposes. The above information is an  only. It is not intended as medical advice for individual conditions or treatments. Talk to your doctor, nurse or pharmacist before following any medical regimen to see if it is safe and effective for you. continue eye drops Hydroxychloroquine Pregnancy And Lactation Text: This medication has been shown to cause fetal harm but it isn't assigned a Pregnancy Risk Category. There are small amounts excreted in breast milk.

## 2023-08-07 NOTE — PROGRESS NOTES
Name: Chris French      : 1960      MRN: [id number]  Encounter Provider: Kenneth Johansen MD  Encounter Date: 2023   Encounter department: [address] [facility]     1. Left ear pain  -     ibuprofen (MOTRIN) 400 mg tablet; Take 1 tablet (400 mg total) by mouth every 6 (six) hours as needed for mild pain    2. Bilateral impacted cerumen  -     carbamide peroxide (DEBROX) 6.5 % otic solution; Administer 5 drops into both ears 2 (two) times a day    - Reports ongoing left ear pain of 1 week duration. Recent history of swimming in the pool. Patient was seen and evaluated in office 3 days ago. He was found to have acute swimmers ear and started on topical ciprofloxacin-hydrocortisone eardrops for 7 days. Patient states that history of have pain despite using eardrops. On exam, no evidence of infection, tympanic membrane visible with cerumen impaction along ear, bilaterally. Denies fever, chills, sinus drainage, sinus pain or pressure, chest pain, shortness of breath, N/V/D. Plan  -Encourage patient to continue on Cipro floxacillin-hydrocortisone eardrop for 7 days to completion. -will start ibuprofen 400 mg every 6 as needed for pain control  -will start Debrox eardrops for cerumen accumulation after completion of topical antibiotics. -Avoid trauma to ear  -Return precautions discussed with patient  -Follow-up with PCP as needed       Subjective      Clifford Blum 58 y.o. male  has no past medical history on file. Presenting today for evaluation of left sided ear pain and decreased hearing. Patient reports symptoms started about 1 week ago after swimming in a pool. Patient states he has had decreased hearing sensation that is chronic and also reports history of multiple ear infection which was treated with topical antibiotics in his home country [facility].  denies fatigue, headaches, dizziness, blurred vision, nausea, palpitation, chest pain, SOB, urinary changes, weakness, bowel changes, sleep problems,  sick contacts, red flag signs,  or recent travel.   Overall patient reports feeling well.  Patient has no further complaints other than what is mentioned in the ROS. Review of Systems   Constitutional: Negative for chills and fever. HENT: Positive for ear pain and hearing loss. Negative for ear discharge, rhinorrhea and sore throat. Eyes: Negative for pain and visual disturbance. Respiratory: Negative for cough and shortness of breath. Cardiovascular: Negative for chest pain and palpitations. Gastrointestinal: Negative for abdominal pain and vomiting. Genitourinary: Negative for dysuria and hematuria. Musculoskeletal: Negative for arthralgias and back pain. Skin: Negative for color change and rash. Neurological: Negative for seizures, syncope and headaches. All other systems reviewed and are negative. Current Outpatient Medications on File Prior to Visit   Medication Sig   • ciprofloxacin-hydrocortisone (CIPRO HC OTIC) otic suspension Administer 3 drops into the left ear 2 (two) times a day for 7 days   • famotidine (PEPCID) 20 mg tablet Take 1 tablet (20 mg total) by mouth 2 (two) times a day   • lisinopril (ZESTRIL) 20 mg tablet Take 1 tablet (20 mg total) by mouth daily (Patient not taking: Reported on 7/12/2023)   • simvastatin (ZOCOR) 20 mg tablet Take 1 tablet (20 mg total) by mouth daily at bedtime     No Known Allergies  History reviewed. No pertinent past medical history.   Social History     Socioeconomic History   • Marital status: /Civil Union     Spouse name: Not on file   • Number of children: Not on file   • Years of education: Not on file   • Highest education level: Not on file   Occupational History   • Not on file   Tobacco Use   • Smoking status: Never   • Smokeless tobacco: Never   Substance and Sexual Activity   • Alcohol use: Not on file   • Drug use: Not on file   • Sexual activity: Not on file   Other Topics Concern   • Not on file   Social History Narrative   • Not on file     Social Determinants of Health     Financial Resource Strain: Low Risk  (6/22/2023)    Overall Financial Resource Strain (CARDIA)    • Difficulty of Paying Living Expenses: Not hard at all   Food Insecurity: No Food Insecurity (6/22/2023)    Hunger Vital Sign    • Worried About Running Out of Food in the Last Year: Never true    • Ran Out of Food in the Last Year: Never true   Transportation Needs: No Transportation Needs (6/22/2023)    PRAPARE - Transportation    • Lack of Transportation (Medical): No    • Lack of Transportation (Non-Medical): No   Physical Activity: Not on file   Stress: Not on file   Social Connections: Not on file   Intimate Partner Violence: Not on file   Housing Stability: Not on file     History reviewed. No pertinent surgical history. History reviewed. No pertinent family history. Objective     /78 (BP Location: Left arm, Patient Position: Sitting, Cuff Size: Standard)   Pulse 76   Temp (!) 96.1 °F (35.6 °C) (Temporal)   Resp 16   Ht 5' 5" (1.651 m)   Wt 69.9 kg (154 lb)   SpO2 98%   BMI 25.63 kg/m²     Physical Exam  Vitals and nursing note reviewed. Constitutional:       General: He is not in acute distress. Appearance: Normal appearance. He is not ill-appearing. HENT:      Head: Normocephalic and atraumatic. Right Ear: Tympanic membrane, ear canal and external ear normal.      Left Ear: Tenderness (tenderness at the traggus ) present. No middle ear effusion. Nose: Nose normal.      Mouth/Throat:      Mouth: Mucous membranes are moist.   Eyes:      General:         Right eye: No discharge. Left eye: No discharge. Pupils: Pupils are equal, round, and reactive to light. Cardiovascular:      Pulses: Normal pulses. Heart sounds: Normal heart sounds. Pulmonary:      Effort: Pulmonary effort is normal. No respiratory distress.       Breath sounds: Normal breath sounds. No wheezing. Abdominal:      General: Bowel sounds are normal.      Palpations: Abdomen is soft. Tenderness: There is no abdominal tenderness. There is no right CVA tenderness or left CVA tenderness. Musculoskeletal:         General: Normal range of motion. Skin:     General: Skin is warm and dry. Neurological:      General: No focal deficit present. Mental Status: He is alert and oriented to person, place, and time.        Trish Rose MD

## 2023-08-28 ENCOUNTER — ANESTHESIA EVENT (OUTPATIENT)
Dept: GASTROENTEROLOGY | Facility: HOSPITAL | Age: 63
End: 2023-08-28

## 2023-08-28 ENCOUNTER — HOSPITAL ENCOUNTER (OUTPATIENT)
Dept: GASTROENTEROLOGY | Facility: HOSPITAL | Age: 63
Setting detail: OUTPATIENT SURGERY
Discharge: HOME | End: 2023-08-28
Attending: SURGERY | Admitting: SURGERY
Payer: MEDICARE

## 2023-08-28 ENCOUNTER — ANESTHESIA (OUTPATIENT)
Dept: GASTROENTEROLOGY | Facility: HOSPITAL | Age: 63
End: 2023-08-28

## 2023-08-28 VITALS
TEMPERATURE: 97.2 F | HEART RATE: 90 BPM | SYSTOLIC BLOOD PRESSURE: 126 MMHG | OXYGEN SATURATION: 97 % | DIASTOLIC BLOOD PRESSURE: 72 MMHG | RESPIRATION RATE: 16 BRPM

## 2023-08-28 DIAGNOSIS — Z12.11 SCREEN FOR COLON CANCER: ICD-10-CM

## 2023-08-28 PROCEDURE — 45378 DIAGNOSTIC COLONOSCOPY: CPT | Performed by: SURGERY

## 2023-08-28 RX ORDER — LIDOCAINE HYDROCHLORIDE 10 MG/ML
INJECTION, SOLUTION EPIDURAL; INFILTRATION; INTRACAUDAL; PERINEURAL AS NEEDED
Status: DISCONTINUED | OUTPATIENT
Start: 2023-08-28 | End: 2023-08-28

## 2023-08-28 RX ORDER — PROPOFOL 10 MG/ML
INJECTION, EMULSION INTRAVENOUS AS NEEDED
Status: DISCONTINUED | OUTPATIENT
Start: 2023-08-28 | End: 2023-08-28

## 2023-08-28 RX ORDER — SODIUM CHLORIDE, SODIUM LACTATE, POTASSIUM CHLORIDE, CALCIUM CHLORIDE 600; 310; 30; 20 MG/100ML; MG/100ML; MG/100ML; MG/100ML
125 INJECTION, SOLUTION INTRAVENOUS CONTINUOUS
Status: DISCONTINUED | OUTPATIENT
Start: 2023-08-28 | End: 2023-09-01 | Stop reason: HOSPADM

## 2023-08-28 RX ADMIN — PROPOFOL 140 MG: 10 INJECTION, EMULSION INTRAVENOUS at 10:53

## 2023-08-28 RX ADMIN — LIDOCAINE HYDROCHLORIDE 50 MG: 10 INJECTION, SOLUTION EPIDURAL; INFILTRATION; INTRACAUDAL at 10:52

## 2023-08-28 RX ADMIN — PROPOFOL 110 MG: 10 INJECTION, EMULSION INTRAVENOUS at 10:52

## 2023-08-28 RX ADMIN — SODIUM CHLORIDE, SODIUM LACTATE, POTASSIUM CHLORIDE, AND CALCIUM CHLORIDE 125 ML/HR: .6; .31; .03; .02 INJECTION, SOLUTION INTRAVENOUS at 08:31

## 2023-08-28 NOTE — H&P
Assessment/Plan:  Discussed risks and benefits of colonoscopy including low risk of bleeding if polypectomy performed, abdominal discomfort/bloating and very small risk of colon perforation. Explained bowel prep in detail and gave instructions detailing appropriate pre-procedure diet and medication use. Prescription for bowel prep will be sent to the pharmacy. Procedure will be scheduled at earliest convenience.      No problem-specific Assessment & Plan notes found for this encounter.         Diagnoses and all orders for this visit:     Screen for colon cancer  -     Ambulatory Referral to General Surgery  -     polyethylene glycol (GLYCOLAX) 17 GM/SCOOP powder; Take 238 g by mouth see administration instructions Mix 238 g with 64 oz of clear liquid. Drink half starting at noon on the day prior to colonoscopy. Drink remaining half 6 hours prior to procedure on day of colonoscopy. -     bisacodyl (DULCOLAX) 5 mg EC tablet; Take 1 tablet (5 mg total) by mouth see administration instructions Take 2 tablets at 12:00 p.m. on day prior to colonoscopy. Take 2 tablets at 4:00 p.m. on day prior to colonoscopy     Other orders  -     lisinopril (ZESTRIL) 10 mg tablet; Take 10 mg by mouth daily            Subjective:       Patient ID: Gabriel Stiles is a 58 y.o. male.     He presents for screening colonoscopy. He has had colonoscopy in the past he thinks around 5 years ago. He was told that he had some polyps removed. He denies any abdominal pain or rectal pain, no blood in his stool no family history of colon cancer.        The following portions of the patient's history were reviewed and updated as appropriate:   He  has no past medical history on file.   He        Patient Active Problem List     Diagnosis Date Noted   • Macrocytic anemia 07/01/2023   • Stage 2 chronic kidney disease 07/01/2023   • Suprapubic tenderness 06/22/2023   • Essential hypertension 10/11/2018   • Mixed hyperlipidemia 10/11/2018   • Cervicalgia 10/11/2018      He  has no past surgical history on file. His family history is not on file. He  reports that he has never smoked. He has never used smokeless tobacco. No history on file for alcohol use and drug use. Current Outpatient Medications   Medication Sig Dispense Refill   • famotidine (PEPCID) 20 mg tablet Take 1 tablet (20 mg total) by mouth 2 (two) times a day 60 tablet 0   • lisinopril (ZESTRIL) 10 mg tablet Take 10 mg by mouth daily       • simvastatin (ZOCOR) 20 mg tablet Take 1 tablet (20 mg total) by mouth daily at bedtime 90 tablet 0   • lisinopril (ZESTRIL) 20 mg tablet Take 1 tablet (20 mg total) by mouth daily (Patient not taking: Reported on 7/12/2023) 30 tablet 5      No current facility-administered medications for this visit.      He has No Known Allergies. .     Review of Systems   Gastrointestinal: Negative for abdominal pain, anal bleeding, blood in stool, constipation, diarrhea and rectal pain. Musculoskeletal: Positive for neck pain. All other systems reviewed and are negative.         Objective:        /76 (BP Location: Left arm, Patient Position: Sitting, Cuff Size: Standard)   Pulse 87   Temp 97.9 °F (36.6 °C) (Tympanic)   Ht 5' 5" (1.651 m)   Wt 68.9 kg (152 lb)   SpO2 98%   BMI 25.29 kg/m²             Physical Exam  Vitals reviewed. Constitutional:       General: He is not in acute distress. Appearance: He is normal weight. HENT:      Head: Normocephalic and atraumatic. Nose: Nose normal.      Mouth/Throat:      Mouth: Mucous membranes are moist.      Pharynx: Oropharynx is clear. Eyes:      Extraocular Movements: Extraocular movements intact. Conjunctiva/sclera: Conjunctivae normal.      Pupils: Pupils are equal, round, and reactive to light. Cardiovascular:      Rate and Rhythm: Normal rate and regular rhythm. Heart sounds: Normal heart sounds.    Pulmonary:      Effort: Pulmonary effort is normal. No respiratory distress. Breath sounds: Normal breath sounds. Abdominal:      General: Abdomen is flat. There is no distension. Palpations: Abdomen is soft. Tenderness: There is no abdominal tenderness. Musculoskeletal:         General: Normal range of motion. Cervical back: Normal range of motion and neck supple. Skin:     General: Skin is warm and dry. Neurological:      General: No focal deficit present. Mental Status: He is alert and oriented to person, place, and time.

## 2023-08-28 NOTE — ANESTHESIA PREPROCEDURE EVALUATION
Procedure:  COLONOSCOPY    Relevant Problems   CARDIO   (+) Essential hypertension   (+) Mixed hyperlipidemia      /RENAL   (+) Stage 2 chronic kidney disease      HEMATOLOGY   (+) Macrocytic anemia      Nervous and Auditory   (+) Acute swimmer's ear of left side      Other   (+) Cervicalgia   (+) Suprapubic tenderness        Physical Exam    Airway    Mallampati score: II  TM Distance: >3 FB  Neck ROM: full     Dental   upper dentures and lower dentures,     Cardiovascular  Cardiovascular exam normal    Pulmonary  Pulmonary exam normal     Other Findings        Anesthesia Plan  ASA Score- 2     Anesthesia Type- IV sedation with anesthesia with ASA Monitors. Additional Monitors:   Airway Plan:           Plan Factors-Exercise tolerance (METS): >4 METS. Chart reviewed. EKG reviewed. Existing labs reviewed. Patient is not a current smoker. Patient not instructed to abstain from smoking on day of procedure. Patient did not smoke on day of surgery. Induction- intravenous. Postoperative Plan-     Informed Consent- Anesthetic plan and risks discussed with patient. I personally reviewed this patient with the CRNA. Discussed and agreed on the Anesthesia Plan with the CRNA. Keny Chisholm

## 2023-08-28 NOTE — ANESTHESIA POSTPROCEDURE EVALUATION
Post-Op Assessment Note    CV Status:  Stable    Pain management: adequate     Mental Status:  Alert and awake   Hydration Status:  Euvolemic   PONV Controlled:  Controlled   Airway Patency:  Patent      Post Op Vitals Reviewed: Yes      Staff: CRNA         No notable events documented.     /71 (08/28/23 1114)    Temp (!) 97 °F (36.1 °C) (08/28/23 1114)    Pulse 73 (08/28/23 1114)   Resp 14 (08/28/23 1114)    SpO2 100 % (08/28/23 1114)

## 2023-08-28 NOTE — NURSING NOTE
Pt. Was able to tolerate food and drink and is no apparent distress. Discharge instructions given to patient, patient understood teaching.

## 2023-09-05 DIAGNOSIS — N13.30 HYDRONEPHROSIS, UNSPECIFIED HYDRONEPHROSIS TYPE: Primary | ICD-10-CM

## 2023-09-05 DIAGNOSIS — N20.0 CALCULUS OF RIGHT KIDNEY: ICD-10-CM

## 2023-10-03 PROBLEM — H60.332 ACUTE SWIMMER'S EAR OF LEFT SIDE: Status: RESOLVED | Noted: 2023-08-04 | Resolved: 2023-10-03

## 2024-04-15 ENCOUNTER — HOSPITAL ENCOUNTER (OUTPATIENT)
Dept: RADIOLOGY | Facility: HOSPITAL | Age: 64
Discharge: HOME | End: 2024-04-15
Payer: MEDICARE

## 2024-04-15 ENCOUNTER — APPOINTMENT (OUTPATIENT)
Dept: LAB | Facility: HOSPITAL | Age: 64
End: 2024-04-15
Payer: MEDICARE

## 2024-04-15 ENCOUNTER — OFFICE VISIT (OUTPATIENT)
Dept: FAMILY MEDICINE CLINIC | Facility: CLINIC | Age: 64
End: 2024-04-15

## 2024-04-15 VITALS
BODY MASS INDEX: 25.33 KG/M2 | DIASTOLIC BLOOD PRESSURE: 84 MMHG | HEART RATE: 76 BPM | HEIGHT: 65 IN | TEMPERATURE: 97.6 F | WEIGHT: 152 LBS | SYSTOLIC BLOOD PRESSURE: 142 MMHG | OXYGEN SATURATION: 98 % | RESPIRATION RATE: 18 BRPM

## 2024-04-15 DIAGNOSIS — G89.29 CHRONIC RIGHT SHOULDER PAIN: ICD-10-CM

## 2024-04-15 DIAGNOSIS — I10 ESSENTIAL HYPERTENSION: ICD-10-CM

## 2024-04-15 DIAGNOSIS — N18.2 STAGE 2 CHRONIC KIDNEY DISEASE: ICD-10-CM

## 2024-04-15 DIAGNOSIS — I10 HYPERTENSION GOAL BP (BLOOD PRESSURE) < 150/90: Primary | ICD-10-CM

## 2024-04-15 DIAGNOSIS — D53.9 MACROCYTIC ANEMIA: ICD-10-CM

## 2024-04-15 DIAGNOSIS — K21.9 GASTROESOPHAGEAL REFLUX DISEASE, UNSPECIFIED WHETHER ESOPHAGITIS PRESENT: ICD-10-CM

## 2024-04-15 DIAGNOSIS — M25.511 CHRONIC RIGHT SHOULDER PAIN: ICD-10-CM

## 2024-04-15 PROBLEM — R10.819 SUPRAPUBIC TENDERNESS: Status: RESOLVED | Noted: 2023-06-22 | Resolved: 2024-04-15

## 2024-04-15 LAB
ALBUMIN SERPL BCP-MCNC: 4.8 G/DL (ref 3.5–5)
ALP SERPL-CCNC: 87 U/L (ref 34–104)
ALT SERPL W P-5'-P-CCNC: 9 U/L (ref 7–52)
ANION GAP SERPL CALCULATED.3IONS-SCNC: 11 MMOL/L (ref 4–13)
AST SERPL W P-5'-P-CCNC: 22 U/L (ref 13–39)
BASOPHILS # BLD AUTO: 0.03 THOUSANDS/ÂΜL (ref 0–0.1)
BASOPHILS NFR BLD AUTO: 0 % (ref 0–1)
BILIRUB SERPL-MCNC: 0.55 MG/DL (ref 0.2–1)
BUN SERPL-MCNC: 10 MG/DL (ref 5–25)
CALCIUM SERPL-MCNC: 9.8 MG/DL (ref 8.4–10.2)
CHLORIDE SERPL-SCNC: 102 MMOL/L (ref 96–108)
CHOLEST SERPL-MCNC: 226 MG/DL
CO2 SERPL-SCNC: 25 MMOL/L (ref 21–32)
CREAT SERPL-MCNC: 1.03 MG/DL (ref 0.6–1.3)
CREAT UR-MCNC: 125.8 MG/DL
EOSINOPHIL # BLD AUTO: 0.05 THOUSAND/ÂΜL (ref 0–0.61)
EOSINOPHIL NFR BLD AUTO: 1 % (ref 0–6)
ERYTHROCYTE [DISTWIDTH] IN BLOOD BY AUTOMATED COUNT: 12 % (ref 11.6–15.1)
FOLATE SERPL-MCNC: 12.8 NG/ML
GFR SERPL CREATININE-BSD FRML MDRD: 75 ML/MIN/1.73SQ M
GLUCOSE P FAST SERPL-MCNC: 96 MG/DL (ref 65–99)
HCT VFR BLD AUTO: 47.6 % (ref 36.5–49.3)
HDLC SERPL-MCNC: 69 MG/DL
HGB BLD-MCNC: 15.4 G/DL (ref 12–17)
IMM GRANULOCYTES # BLD AUTO: 0.02 THOUSAND/UL (ref 0–0.2)
IMM GRANULOCYTES NFR BLD AUTO: 0 % (ref 0–2)
LDLC SERPL CALC-MCNC: 131 MG/DL (ref 0–100)
LYMPHOCYTES # BLD AUTO: 1.58 THOUSANDS/ÂΜL (ref 0.6–4.47)
LYMPHOCYTES NFR BLD AUTO: 23 % (ref 14–44)
MCH RBC QN AUTO: 32.1 PG (ref 26.8–34.3)
MCHC RBC AUTO-ENTMCNC: 32.4 G/DL (ref 31.4–37.4)
MCV RBC AUTO: 99 FL (ref 82–98)
MICROALBUMIN UR-MCNC: 13.9 MG/L
MICROALBUMIN/CREAT 24H UR: 11 MG/G CREATININE (ref 0–30)
MONOCYTES # BLD AUTO: 0.52 THOUSAND/ÂΜL (ref 0.17–1.22)
MONOCYTES NFR BLD AUTO: 8 % (ref 4–12)
NEUTROPHILS # BLD AUTO: 4.64 THOUSANDS/ÂΜL (ref 1.85–7.62)
NEUTS SEG NFR BLD AUTO: 68 % (ref 43–75)
NRBC BLD AUTO-RTO: 0 /100 WBCS
PLATELET # BLD AUTO: 123 THOUSANDS/UL (ref 149–390)
PMV BLD AUTO: 11.7 FL (ref 8.9–12.7)
POTASSIUM SERPL-SCNC: 4.3 MMOL/L (ref 3.5–5.3)
PROT SERPL-MCNC: 7.9 G/DL (ref 6.4–8.4)
RBC # BLD AUTO: 4.8 MILLION/UL (ref 3.88–5.62)
SODIUM SERPL-SCNC: 138 MMOL/L (ref 135–147)
TRIGL SERPL-MCNC: 130 MG/DL
VIT B12 SERPL-MCNC: 328 PG/ML (ref 180–914)
WBC # BLD AUTO: 6.84 THOUSAND/UL (ref 4.31–10.16)

## 2024-04-15 PROCEDURE — 80061 LIPID PANEL: CPT

## 2024-04-15 PROCEDURE — 82570 ASSAY OF URINE CREATININE: CPT

## 2024-04-15 PROCEDURE — 82607 VITAMIN B-12: CPT

## 2024-04-15 PROCEDURE — 99213 OFFICE O/P EST LOW 20 MIN: CPT | Performed by: FAMILY MEDICINE

## 2024-04-15 PROCEDURE — 80053 COMPREHEN METABOLIC PANEL: CPT

## 2024-04-15 PROCEDURE — 85025 COMPLETE CBC W/AUTO DIFF WBC: CPT

## 2024-04-15 PROCEDURE — 36415 COLL VENOUS BLD VENIPUNCTURE: CPT

## 2024-04-15 PROCEDURE — 73030 X-RAY EXAM OF SHOULDER: CPT

## 2024-04-15 PROCEDURE — 82043 UR ALBUMIN QUANTITATIVE: CPT

## 2024-04-15 PROCEDURE — 82746 ASSAY OF FOLIC ACID SERUM: CPT

## 2024-04-15 RX ORDER — ACETAMINOPHEN 500 MG
500 TABLET ORAL EVERY 6 HOURS PRN
Qty: 120 TABLET | Refills: 0 | Status: SHIPPED | OUTPATIENT
Start: 2024-04-15 | End: 2024-05-15

## 2024-04-15 RX ORDER — PANTOPRAZOLE SODIUM 40 MG/1
40 TABLET, DELAYED RELEASE ORAL DAILY
Qty: 30 TABLET | Refills: 2 | Status: SHIPPED | OUTPATIENT
Start: 2024-04-15 | End: 2024-07-14

## 2024-04-15 RX ORDER — LISINOPRIL 20 MG/1
20 TABLET ORAL DAILY
Qty: 30 TABLET | Refills: 5 | Status: SHIPPED | OUTPATIENT
Start: 2024-04-15

## 2024-04-15 NOTE — PROGRESS NOTES
Name: Clifford Blum      : 1960      MRN: [id number]  Encounter Provider: Jose Simmons MD  Encounter Date: 4/15/2024   Encounter department: [facility]    Assessment & Plan     1. Hypertension goal BP (blood pressure) < 150/90  Assessment & Plan:  BP Readings from Last 3 Encounters:   04/15/24 142/84   23 126/72   23 118/78      Currently at goal per JNC8. Continue with lisinopril 20mg daily. Refill sent. Repeat lipid panel ordered.    Orders:  -     Lipid Panel with Direct LDL reflex; Future  -     lisinopril (ZESTRIL) 20 mg tablet; Take 1 tablet (20 mg total) by mouth daily    2. Chronic right shoulder pain  Assessment & Plan:  Likely due to rotator cuff injury given positive provacative testing. Will order right shoulder XR and begin therapy with tylenol 500mg q6PRN for pain. Can consider shoulder injection if continued pain and MRI in future. Can also consider PT for strengthening.    Orders:  -     XR shoulder 2+ vw right; Future; Expected date: 04/15/2024  -     acetaminophen (TYLENOL) 500 mg tablet; Take 1 tablet (500 mg total) by mouth every 6 (six) hours as needed for mild pain    3. Gastroesophageal reflux disease, unspecified whether esophagitis present  Assessment & Plan:  Controlled. Can continue with pantoprazole 40mg PRN.    Orders:  -     pantoprazole (PROTONIX) 40 mg tablet; Take 1 tablet (40 mg total) by mouth daily           Subjective      65 yo male with hx of HTN presenting to clinic for HTN follow up and requesting evaluation of right shoulder pain. Shoulder pain has been going on for one year, non-radiating, with no notable weakness or changes in sensation. Denying trauma or pulling/pushing motions preceding pain. He notes that pain began after having received Covid vaccine a year ago. Has tried advil or motrin which significantly improves pain. Aggravated with overhead motions.      Review of Systems   Constitutional:  Negative  "for chills and fever.   Respiratory:  Negative for cough and shortness of breath.    Cardiovascular:  Negative for chest pain.   Gastrointestinal:  Negative for abdominal pain, constipation, diarrhea, nausea and vomiting.   Genitourinary:  Negative for dysuria and hematuria.       Current Outpatient Medications on File Prior to Visit   Medication Sig    [DISCONTINUED] carbamide peroxide (DEBROX) 6.5 % otic solution Administer 5 drops into both ears 2 (two) times a day    [DISCONTINUED] ciprofloxacin-hydrocortisone (CIPRO HC OTIC) otic suspension Administer 3 drops into the left ear 2 (two) times a day for 7 days    [DISCONTINUED] famotidine (PEPCID) 20 mg tablet Take 1 tablet (20 mg total) by mouth 2 (two) times a day    [DISCONTINUED] ibuprofen (MOTRIN) 400 mg tablet Take 1 tablet (400 mg total) by mouth every 6 (six) hours as needed for mild pain    [DISCONTINUED] lisinopril (ZESTRIL) 20 mg tablet Take 1 tablet (20 mg total) by mouth daily (Patient not taking: Reported on 7/12/2023)    [DISCONTINUED] simvastatin (ZOCOR) 20 mg tablet Take 1 tablet (20 mg total) by mouth daily at bedtime       Objective     /84 (BP Location: Left arm, Patient Position: Sitting, Cuff Size: Standard)   Pulse 76   Temp 97.6 °F (36.4 °C) (Temporal)   Resp 18   Ht 5' 5\" (1.651 m)   Wt 68.9 kg (152 lb)   SpO2 98%   BMI 25.29 kg/m²     Physical Exam  Vitals and nursing note reviewed.   Constitutional:       General: He is not in acute distress.     Appearance: Normal appearance. He is not ill-appearing, toxic-appearing or diaphoretic.   HENT:      Head: Normocephalic.      Nose: Nose normal.   Eyes:      General: No scleral icterus.        Right eye: No discharge.         Left eye: No discharge.      Conjunctiva/sclera: Conjunctivae normal.   Cardiovascular:      Rate and Rhythm: Normal rate and regular rhythm.      Pulses: Normal pulses.      Heart sounds: Normal heart sounds. No murmur heard.     No friction rub. No gallop. "   Pulmonary:      Effort: Pulmonary effort is normal. No respiratory distress.      Breath sounds: Normal breath sounds. No stridor. No wheezing, rhonchi or rales.   Chest:      Chest wall: No tenderness.   Musculoskeletal:      Right shoulder: Tenderness present. Decreased range of motion.      Left shoulder: Normal. No tenderness. Normal range of motion.        Arms:       Comments: Empty can test positive/no weakness  External/internal rotation positive/no weakness  Belly press test positive/no weakness  Painful arc positive   Skin:     General: Skin is warm and dry.      Coloration: Skin is not jaundiced or pale.      Findings: No bruising, erythema, lesion or rash.   Neurological:      General: No focal deficit present.      Mental Status: He is alert.   Psychiatric:         Mood and Affect: Mood normal.         Behavior: Behavior normal.       Jose Simmons MD

## 2024-04-15 NOTE — ASSESSMENT & PLAN NOTE
BP Readings from Last 3 Encounters:   04/15/24 142/84   08/28/23 126/72   08/07/23 118/78      Currently at goal per JNC8. Continue with lisinopril 20mg daily. Refill sent. Repeat lipid panel ordered.

## 2024-04-15 NOTE — ASSESSMENT & PLAN NOTE
Likely due to rotator cuff injury given positive provacative testing. Will order right shoulder XR and begin therapy with tylenol 500mg q6PRN for pain. Can consider shoulder injection if continued pain and MRI in future. Can also consider PT for strengthening.

## 2024-04-16 DIAGNOSIS — E78.2 MIXED HYPERLIPIDEMIA: Primary | ICD-10-CM

## 2024-04-16 RX ORDER — ROSUVASTATIN CALCIUM 10 MG/1
10 TABLET, COATED ORAL DAILY
Qty: 30 TABLET | Refills: 5 | Status: SHIPPED | OUTPATIENT
Start: 2024-04-16

## 2024-04-16 NOTE — PROGRESS NOTES
LDL >100 in pt with HTN and no DM. ASCVD risk of 15.9%. will begin moderate intensity statin and adjust as needed to bring LDL to goal <100

## 2024-05-07 ENCOUNTER — TELEPHONE (OUTPATIENT)
Dept: FAMILY MEDICINE CLINIC | Facility: CLINIC | Age: 64
End: 2024-05-07

## 2024-05-07 NOTE — TELEPHONE ENCOUNTER
first attempt to contact patient. Some one answer and said that this person was not in the hose to talk to him

## 2024-05-29 ENCOUNTER — OFFICE VISIT (OUTPATIENT)
Dept: FAMILY MEDICINE CLINIC | Facility: CLINIC | Age: 64
End: 2024-05-29

## 2024-05-29 VITALS
TEMPERATURE: 98.6 F | OXYGEN SATURATION: 98 % | HEART RATE: 90 BPM | HEIGHT: 65 IN | WEIGHT: 148 LBS | BODY MASS INDEX: 24.66 KG/M2 | RESPIRATION RATE: 16 BRPM | SYSTOLIC BLOOD PRESSURE: 148 MMHG | DIASTOLIC BLOOD PRESSURE: 90 MMHG

## 2024-05-29 DIAGNOSIS — I10 ESSENTIAL HYPERTENSION: Primary | ICD-10-CM

## 2024-05-29 DIAGNOSIS — M25.511 CHRONIC RIGHT SHOULDER PAIN: ICD-10-CM

## 2024-05-29 DIAGNOSIS — G89.29 CHRONIC RIGHT SHOULDER PAIN: ICD-10-CM

## 2024-05-29 DIAGNOSIS — M25.421 ELBOW EFFUSION, RIGHT: ICD-10-CM

## 2024-05-29 PROCEDURE — 99213 OFFICE O/P EST LOW 20 MIN: CPT | Performed by: NURSE PRACTITIONER

## 2024-05-29 PROCEDURE — 96372 THER/PROPH/DIAG INJ SC/IM: CPT

## 2024-05-29 RX ORDER — KETOROLAC TROMETHAMINE 30 MG/ML
30 INJECTION, SOLUTION INTRAMUSCULAR; INTRAVENOUS ONCE
Status: COMPLETED | OUTPATIENT
Start: 2024-05-29 | End: 2024-05-29

## 2024-05-29 RX ORDER — METHYLPREDNISOLONE 4 MG/1
TABLET ORAL
Qty: 21 EACH | Refills: 0 | Status: SHIPPED | OUTPATIENT
Start: 2024-05-29

## 2024-05-29 RX ADMIN — KETOROLAC TROMETHAMINE 30 MG: 30 INJECTION, SOLUTION INTRAMUSCULAR; INTRAVENOUS at 10:23

## 2024-05-29 NOTE — ASSESSMENT & PLAN NOTE
Recent x-ray 4/2024 demonstrated no significant abnormality   No instability of joint on exam   Recommend PT and FU with PCP in several weeks

## 2024-05-29 NOTE — ASSESSMENT & PLAN NOTE
BP slightly above goal but appears overall well controlled   Continue with current regimen: lisinopril 20 mg daily

## 2024-05-29 NOTE — PATIENT INSTRUCTIONS
Shoulder Pain   WHAT YOU NEED TO KNOW:   Shoulder pain is a common problem that can affect your daily activities. Pain can be caused by a problem within your shoulder, such as soreness of a tendon or bursa. A tendon is a cord of tough tissue that connects your muscles to your bones. The bursa is a fluid-filled sac that acts as a cushion between a bone and a tendon. Shoulder pain may also be caused by pain that spreads to your shoulder from another part of your body.       DISCHARGE INSTRUCTIONS:   Return to the emergency department if:   You have severe pain.    You cannot move your arm or shoulder.    You have numbness or tingling in your shoulder or arm.    Contact your healthcare provider if:   Your pain gets worse or does not go away with treatment.    You have trouble moving your arm or shoulder.    You have questions or concerns about your condition or care.    Medicines:  You may need any of the following:  Acetaminophen  decreases pain and fever. It is available without a doctor's order. Ask how much to take and how often to take it. Follow directions. Read the labels of all other medicines you are using to see if they also contain acetaminophen, or ask your doctor or pharmacist. Acetaminophen can cause liver damage if not taken correctly.    NSAIDs , such as ibuprofen, help decrease swelling, pain, and fever. This medicine is available with or without a doctor's order. NSAIDs can cause stomach bleeding or kidney problems in certain people. If you take blood thinner medicine, always ask your healthcare provider if NSAIDs are safe for you. Always read the medicine label and follow directions.    Take your medicine as directed.  Contact your healthcare provider if you think your medicine is not helping or if you have side effects. Tell your provider if you are allergic to any medicine. Keep a list of the medicines, vitamins, and herbs you take. Include the amounts, and when and why you take them. Bring the  list or the pill bottles to follow-up visits. Carry your medicine list with you in case of an emergency.    Manage your symptoms:   Apply ice  on your shoulder for 20 to 30 minutes every 2 hours or as directed. Use an ice pack, or put crushed ice in a plastic bag. Cover it with a towel before you apply it to your shoulder. Ice helps prevent tissue damage and decreases swelling and pain.    Apply heat if ice does not help your symptoms.  Apply heat on your shoulder for 20 to 30 minutes every 2 hours for as many days as directed. Heat helps decrease pain and muscle spasms.    Limit activities as directed.  Try to avoid repeated overhead movements.    Go to physical or occupational therapy as directed.  A physical therapist teaches you exercises to help improve movement and strength, and to decrease pain. An occupational therapist teaches you skills to help with your daily activities.    Prevent shoulder pain:   Maintain a good range of motion in your shoulder.  Ask your healthcare provider which exercises you should do on a regular basis after you have healed.     Stretch and strengthen your shoulder.  Use proper technique during exercises and sports.    Follow up with your healthcare provider or orthopedist as directed:  Write down your questions so you remember to ask them during your visits.   © Copyright [employer] 2023 Information is for End User's use only and may not be sold, redistributed or otherwise used for commercial purposes.  The above information is an  only. It is not intended as medical advice for individual conditions or treatments. Talk to your doctor, nurse or pharmacist before following any medical regimen to see if it is safe and effective for you.

## 2024-05-29 NOTE — ASSESSMENT & PLAN NOTE
Medial epicondyle effusion, patient is doing home repairs and performing repetitive movements   Recommend decrease or avoid aggravating movement, ice area, apply diclofenac topical, trial Medrol pack

## 2024-05-29 NOTE — PROGRESS NOTES
Ambulatory Visit  Name: Clifford Blum      : 1960      MRN: [id number]  Encounter Provider: LYDIA Bennett  Encounter Date: 2024   Encounter department: [facility]    Assessment & Plan   1. Essential hypertension  Assessment & Plan:  BP slightly above goal but appears overall well controlled   Continue with current regimen: lisinopril 20 mg daily   2. Chronic right shoulder pain  Assessment & Plan:  Recent x-ray 2024 demonstrated no significant abnormality   No instability of joint on exam   Recommend PT and FU with PCP in several weeks   Orders:  -     methylPREDNISolone 4 MG tablet therapy pack; Use as directed on package  -     Ambulatory Referral to Physical Therapy; Future  -     Diclofenac Sodium (VOLTAREN) 1 %; Apply 2 g topically 4 (four) times a day  -     ketorolac (TORADOL) injection 30 mg  3. Elbow effusion, right  Assessment & Plan:  Medial epicondyle effusion, patient is doing home repairs and performing repetitive movements   Recommend decrease or avoid aggravating movement, ice area, apply diclofenac topical, trial Medrol pack       Orders:  -     methylPREDNISolone 4 MG tablet therapy pack; Use as directed on package  -     Ambulatory Referral to Physical Therapy; Future  -     Diclofenac Sodium (VOLTAREN) 1 %; Apply 2 g topically 4 (four) times a day  -     ketorolac (TORADOL) injection 30 mg         History of Present Illness     Patient is a 63 YO male who  has a past medical history of Anemia, Chronic kidney disease, Colon polyp, Hyperlipidemia, Hypertension, and Suprapubic tenderness (2023) who presents today for follow up of right shoulder pain.     Shoulder Pain   The pain is present in the right shoulder. This is a chronic problem. The current episode started more than 1 month ago. There has been no history of extremity trauma. The problem occurs intermittently. The problem has been gradually improving. The quality of the  pain is described as aching. The pain is at a severity of 8/10. The pain is moderate. Associated symptoms include joint swelling. Pertinent negatives include no fever, inability to bear weight, itching, joint locking, limited range of motion, numbness, stiffness or tingling. He has tried acetaminophen for the symptoms. The treatment provided mild relief. Family history does not include gout or rheumatoid arthritis. There is no history of diabetes, gout, osteoarthritis or rheumatoid arthritis.     Review of Systems   Constitutional:  Negative for chills and fever.   HENT:  Negative for ear pain and sore throat.    Eyes:  Negative for pain and visual disturbance.   Respiratory:  Negative for cough and shortness of breath.    Cardiovascular:  Negative for chest pain and palpitations.   Gastrointestinal:  Negative for abdominal pain and vomiting.   Genitourinary:  Negative for dysuria and hematuria.   Musculoskeletal:  Positive for arthralgias and joint swelling. Negative for back pain, gout and stiffness.   Skin:  Negative for color change, itching and rash.   Neurological:  Negative for tingling, seizures, syncope and numbness.   All other systems reviewed and are negative.    Past Medical History:   Diagnosis Date    Anemia     Chronic kidney disease     Colon polyp     Hyperlipidemia     Hypertension     Suprapubic tenderness 06/22/2023     No past surgical history on file.  No family history on file.  Social History     Tobacco Use    Smoking status: Never     Passive exposure: Never    Smokeless tobacco: Never   Vaping Use    Vaping status: Never Used   Substance and Sexual Activity    Alcohol use: Yes     Comment: Social    Drug use: Never    Sexual activity: Not on file     Current Outpatient Medications on File Prior to Visit   Medication Sig    lisinopril (ZESTRIL) 20 mg tablet Take 1 tablet (20 mg total) by mouth daily    pantoprazole (PROTONIX) 40 mg tablet Take 1 tablet (40 mg total) by mouth daily     "rosuvastatin (CRESTOR) 10 MG tablet Take 1 tablet (10 mg total) by mouth daily     No Known Allergies  Immunization History   Administered Date(s) Administered    INFLUENZA 11/29/2016    Influenza, recombinant, quadrivalent,injectable, preservative free 10/11/2018    Tdap 10/11/2018     Objective     /90 (BP Location: Right arm, Patient Position: Sitting, Cuff Size: Standard)   Pulse 90   Temp 98.6 °F (37 °C) (Temporal)   Resp 16   Ht 5' 5\" (1.651 m)   Wt 67.1 kg (148 lb)   SpO2 98%   BMI 24.63 kg/m²     Physical Exam  Vitals and nursing note reviewed.   Constitutional:       General: He is not in acute distress.     Appearance: Normal appearance. He is not diaphoretic.   HENT:      Head: Normocephalic and atraumatic.      Right Ear: External ear normal.      Left Ear: External ear normal.   Eyes:      General:         Right eye: No discharge.         Left eye: No discharge.      Conjunctiva/sclera: Conjunctivae normal.   Cardiovascular:      Rate and Rhythm: Normal rate and regular rhythm.   Pulmonary:      Effort: Pulmonary effort is normal. No respiratory distress.      Breath sounds: Normal breath sounds.   Musculoskeletal:      Right shoulder: Tenderness present. Decreased range of motion.      Right elbow: Effusion present. Tenderness present in medial epicondyle.      Cervical back: Normal range of motion and neck supple.   Skin:     General: Skin is warm and dry.   Neurological:      Mental Status: He is alert and oriented to person, place, and time.   Psychiatric:         Mood and Affect: Mood normal.         Behavior: Behavior normal.       Administrative Statements         "

## 2024-06-17 NOTE — PROGRESS NOTES
PT Evaluation     Today's date: 2024  Patient name: Clifford Blum  : 1960  MRN: [id number]  Referring provider: Abby Hilton, *  Dx:   Encounter Diagnosis     ICD-10-CM    1. Right shoulder pain, unspecified chronicity  M25.511       2. Chronic right shoulder pain  M25.511 Ambulatory Referral to Physical Therapy    G89.29       3. Elbow effusion, right  M25.421 Ambulatory Referral to Physical Therapy      4. Right elbow pain  M25.521           Start Time: 09  Stop Time: 1030  Total time in clinic (min): 54 minutes    Assessment  Impairments: abnormal or restricted ROM, activity intolerance, pain with function, poor posture  and endurance  Functional limitations: Inability to lift objects overhead  Symptom irritability: high    Assessment details: Clifford is a 64 year old patient presenting to OPPT for evaluation regarding R elbow and shoulder pain. Upon evaluation they show impairments in the following areas: large limitations in both active and passive shoulder motion. (+) special testing including hawkin's ginny, painful arc, infraspinatus, O'marcos's test. Decreased eccentric control evident, with upper trapezius overcompensation. (+) for night pain. (-) UMN testing. Limited thoracic mobility in all directions. Impairments in cervical motion as well with associated R upper trapezius discomfort during Left rotation and sidebending. For R elbow, (+) for tenderness at common extensor tendon, (+) maudsley test, (+) cozen's test indicative of potential lateral epicondylitis. In general, high irritability associated with movement with chronicity of symptomology. These impairments limit their ability to perform daily activities as well as their previous level of function causing decreased quality of life.    Patient requires continued skilled PT services in order to improve aforementioned impairments so that they are able to return to highest level of function possible. Without initiation of  skilled PT services, patient will continue to have functional deficits aforementioned with decreased overall level of function.    Understanding of Dx/Px/POC: good     Prognosis: good    Goals  Short-Term Goals (4 weeks)   1.  Patient will decrease worst  rating of pain by 2/10 to improve quality of life   2. Pt will increase strength by 0.5 MMT to improve quality of life with improved efficiency of transfers and daily activities  3. Patient will be able to perform home and household duties with 2/10 reduction in pain indicating improved QOL   4. Patient will improve R shoulder AROM by 25% indicating improved mobility of affected area   5. Improve R shoulder PROM to symmetrical to unaffected side in all directions       Long-Term Goals (8 weeks)   1. Patient's R shoulder AROM will be symmetrical to L in all directions   2. Patient will decrease pain by 4/10 at worst in comparison to IE indicating significant reduction in pain and improved quality of life   3. Patient will improve greater than predicted FOTO score   4. Patient will be able to perform household and hobby activities without increase in pain > or equal to 2/10 indicating ability to independently manage pain symptoms to accomplish daily activities.   5. Patient will be independent with HEP with good form accomplished       Plan  Patient would benefit from: skilled physical therapy  Planned modality interventions: cryotherapy, thermotherapy: hydrocollator packs and TENS    Planned therapy interventions: manual therapy, joint mobilization, massage, nerve gliding, neuromuscular re-education, postural training, self care, strengthening, stretching, therapeutic activities, therapeutic exercise, home exercise program, graded exercise, gait training, flexibility and balance    Frequency: 1-2x week  Plan of Care beginning date: 6/17/2024  Plan of Care expiration date: 8/16/2024  Treatment plan discussed with: patient        Subjective Evaluation    History of  "Present Illness  Mechanism of injury: Clifford is a 64 year old patient presenting to OPPT for evaluation for R elbow and shoulder discomfort. Having R shoulder and elbow pain. Elbow discomfort likely due to repetitive home repairs that patient is performing, a lot of repetitive motions including the forearm. Notes that he has been having the pain for about 7-8 months. Has trouble when he is holding things in his RUE, feels that he has to switch to the other arm quickly because he doesn't have the strength or comfort. Started when he was carrying something heavy over his shoulder.     Notes that he has some tingling down the arm. Notes pretty significant weakness of the R shoulder. Pain in the R shoulder gets worse during the night. Feels very deep, \"like it is inside the bone\". Does not wake him up at night.      services used throughout the session.     Per EMR: \"The pain is present in the right shoulder. This is a chronic problem. The current episode started more than 1 month ago. There has been no history of extremity trauma. The problem occurs intermittently. The problem has been gradually improving. The quality of the pain is described as aching. The pain is at a severity of 8/10. The pain is moderate. Associated symptoms include joint swelling. Pertinent negatives include no fever, inability to bear weight, itching, joint locking, limited range of motion, numbness, stiffness or tingling. He has tried acetaminophen for the symptoms. The treatment provided mild relief. Family history does not include gout or rheumatoid arthritis. There is no history of diabetes, gout, osteoarthritis or rheumatoid arthritis.\"   Pain  Current pain ratin  At best pain ratin  At worst pain rating: 10  Location: R shoulder and R elbow      Diagnostic Tests  X-ray: normal    FCE comments: RIGHT SHOULDER     INDICATION:   Pain in right shoulder. Other chronic pain.      COMPARISON:  None.     VIEWS:  XR SHOULDER 2+ " VW RIGHT     FINDINGS:     There is no acute fracture or dislocation.     No significant degenerative changes.     No lytic or blastic osseous lesion.     Soft tissues are unremarkable.     IMPRESSION:        No acute osseous abnormality.        Objective    Flowsheet Rows      Flowsheet Row Most Recent Value   PT/OT G-Codes    Current Score 32   Projected Score 62          Flowsheet Rows      Flowsheet Row Most Recent Value   PT/OT G-Codes    Current Score 32   Projected Score 62        Standing Posture: Forward head and rounder shoulders     Cervical Spine ROM:   Flexion 41  Extension:22   SB:   L 11 + tightness (RUT) R 19  Rotation:  L 33 + tightness R 44    Shoulder Active ROM:   Flexion   L 159  R 108   Abduction   L  161 R 76   Functional IR :  L T6 R L3   Functional ER:  L T8 R Occiput    Shoulder Passive ROM:   Flexion  L   R  122 pain   Abduction  L   R 108 pain     Thoracic Mobility   Flexion 25% with R sided neck pain  Extension 50% limitation   Rotation (R) 50% limitation   Rotation (L) 50% limitation     Palpation: Tenderness supraspinatus, infraspinatus, Bicipital grove, lateral epicondylitis     Special Tests:   Sharp Purcer (-)   Alar Ligament (-)   Compression   Distraction (-)   Spurling A (-)   Upper Cervical Rotation   Upper Limb Tension Testing (-) median, ulnar  Byrd ginny (+)   Speeds (-)   Drop Arm (-)   Infraspinatus (+)   Painful Arc (+)   Apprehension (-)   O'briens (+)     Upper Quarter Screen:   Dermatomes: wnl   Myotomes:   Reflexes: hdz's (-)     Maudsley's Test (+)   Cozen's (+)   Mills (+)     Upper Quarter Strength:   Shoulder   Flexion  L 4+ R 3+ pain   Abduction L 4+ R3+ pain   ER   L 4+ R 3- weak  IR   L 4+ R 4  Extension  L 4+  R  4    Alar ligament, Sharp florence, Transverse ligament (-)        Precautions: HTN, Cervicalgia, R shoulder pain       Manuals 6/18            R shoulder PROM              R elbow PROM              R elbow IASTM              C/S STM  NV          "                 Neuro Re-Ed             Isometrics  10x5\" flex,ER,IR            Row              LPD              TB ER / IR              Serratus press                                        Ther Ex             Pulley  3'             Wall sides              UBE              Cane AAROM              Cane extension              Wall push up              Ball on wall                           Ther Activity                                       Gait Training                                       Modalities             Ice  10'             Moist Heat  NV                  "

## 2024-06-18 ENCOUNTER — EVALUATION (OUTPATIENT)
Dept: PHYSICAL THERAPY | Facility: CLINIC | Age: 64
End: 2024-06-18
Payer: MEDICARE

## 2024-06-18 DIAGNOSIS — M25.521 RIGHT ELBOW PAIN: ICD-10-CM

## 2024-06-18 DIAGNOSIS — G89.29 CHRONIC RIGHT SHOULDER PAIN: ICD-10-CM

## 2024-06-18 DIAGNOSIS — M25.511 CHRONIC RIGHT SHOULDER PAIN: ICD-10-CM

## 2024-06-18 DIAGNOSIS — M25.421 ELBOW EFFUSION, RIGHT: ICD-10-CM

## 2024-06-18 DIAGNOSIS — M25.511 RIGHT SHOULDER PAIN, UNSPECIFIED CHRONICITY: Primary | ICD-10-CM

## 2024-06-18 PROCEDURE — 97010 HOT OR COLD PACKS THERAPY: CPT

## 2024-06-18 PROCEDURE — 97162 PT EVAL MOD COMPLEX 30 MIN: CPT

## 2024-06-18 PROCEDURE — 97112 NEUROMUSCULAR REEDUCATION: CPT

## 2024-06-24 ENCOUNTER — OFFICE VISIT (OUTPATIENT)
Dept: PHYSICAL THERAPY | Facility: CLINIC | Age: 64
End: 2024-06-24
Payer: MEDICARE

## 2024-06-24 DIAGNOSIS — G89.29 CHRONIC RIGHT SHOULDER PAIN: ICD-10-CM

## 2024-06-24 DIAGNOSIS — M25.511 CHRONIC RIGHT SHOULDER PAIN: ICD-10-CM

## 2024-06-24 DIAGNOSIS — M25.421 ELBOW EFFUSION, RIGHT: ICD-10-CM

## 2024-06-24 DIAGNOSIS — M25.511 RIGHT SHOULDER PAIN, UNSPECIFIED CHRONICITY: Primary | ICD-10-CM

## 2024-06-24 DIAGNOSIS — M25.521 RIGHT ELBOW PAIN: ICD-10-CM

## 2024-06-24 PROCEDURE — 97112 NEUROMUSCULAR REEDUCATION: CPT

## 2024-06-24 PROCEDURE — 97140 MANUAL THERAPY 1/> REGIONS: CPT

## 2024-06-24 PROCEDURE — 97110 THERAPEUTIC EXERCISES: CPT

## 2024-06-24 NOTE — PROGRESS NOTES
"Daily Note     Today's date: 2024  Patient name: Clifford Blum  : 1960  MRN: [id number]  Referring provider: Abby Hilton, *  Dx:   Encounter Diagnosis     ICD-10-CM    1. Right shoulder pain, unspecified chronicity  M25.511       2. Chronic right shoulder pain  M25.511     G89.29       3. Elbow effusion, right  M25.421       4. Right elbow pain  M25.521           Start Time: 1130  Stop Time: 1210  Total time in clinic (min): 40 minutes    Subjective: Pt reports no change in symptoms since that initial evaluation with the same pain intensity reported.       Objective: See treatment diary below      Assessment: Pt tolerated treatment well. Pt responded well to manual STM to his neck and shoulder, with successful reduction in symptom intensity and improved ROM post manual interventions. Added [place] [facility] rows, extension, and lat pull down to improve the strength and endurance of shoulder, RTC, scapular stabilization, and postural musculature. Added shoulder AAROM and wall slides during today's session to improve ROM in right shoulder with both flexion and extension. Pt needed moderate cueing from PT for form corrections, but once PT was cued she was able to complete all remaining sets and reps with proper form and appropriate levels of fatigue post session. Patient exhibited good technique with therapeutic exercises and would benefit from continued PT      Plan: Continue per plan of care.  Progress treatment as tolerated.       Precautions: HTN, Cervicalgia, R shoulder pain       Manuals            R shoulder PROM              R elbow PROM              R elbow IASTM              C/S STM  NV  PWK           Shoulder STM  PWK           Neuro Re-Ed             Isometrics  10x5\" flex,ER,IR 10x 5\" IR/ER           Row   3x10 10#           Extensions  3x10 10#           LPD   3x10 15#           TB ER / IR              Serratus press              UT/LS Stt  3x30\"                         Ther Ex  " "6/24           Pulley  3'  3'/3'           Wall sides   10x3 w/ FR           UBE              Cane AAROM   10x 3\" 3# flex/ abd/er           Cane extension              Wall push up              Ball on wall                           Ther Activity  6/24                                     Gait Training  6/24                                     Modalities  6/24           Ice  10'             Moist Heat  NV                  "

## 2024-06-26 ENCOUNTER — OFFICE VISIT (OUTPATIENT)
Dept: FAMILY MEDICINE CLINIC | Facility: CLINIC | Age: 64
End: 2024-06-26

## 2024-06-26 VITALS
OXYGEN SATURATION: 99 % | DIASTOLIC BLOOD PRESSURE: 92 MMHG | RESPIRATION RATE: 18 BRPM | WEIGHT: 148 LBS | SYSTOLIC BLOOD PRESSURE: 155 MMHG | TEMPERATURE: 96.6 F | HEIGHT: 65 IN | HEART RATE: 78 BPM | BODY MASS INDEX: 24.66 KG/M2

## 2024-06-26 DIAGNOSIS — G89.29 CHRONIC RIGHT SHOULDER PAIN: Primary | ICD-10-CM

## 2024-06-26 DIAGNOSIS — M25.511 CHRONIC RIGHT SHOULDER PAIN: Primary | ICD-10-CM

## 2024-06-26 PROCEDURE — 99213 OFFICE O/P EST LOW 20 MIN: CPT | Performed by: FAMILY MEDICINE

## 2024-06-26 NOTE — ASSESSMENT & PLAN NOTE
Recent x-ray 4/2024 demonstrated no significant abnormality   He is attending Physical Therapy  Continue right shoulder pain despite conservative management  Will refer to Orthopedics

## 2024-06-26 NOTE — PROGRESS NOTES
"Ambulatory Visit  Name: Clifford Blum      : 1960      MRN: [id number]  Encounter Provider: Yulissa Crow MD  Encounter Date: 2024   Encounter department: [facility]    Assessment & Plan   1. Chronic right shoulder pain  Assessment & Plan:  Recent x-ray 2024 demonstrated no significant abnormality   He is attending Physical Therapy  Continue right shoulder pain despite conservative management  Will refer to Orthopedics  Orders:  -     Ambulatory Referral to Orthopedic Surgery; Future       History of Present Illness     HPI  Clifford Blum is a 64 y.o. male  who presented to the office today to follow-up for his chronic right shoulder pain.  Patient states that this right shoulder pain started about 6 months ago, it started with a mild pain that was dull.  He works in construction and thinks that this may have exacerbated the shoulder pain.  1 day became very very swollen and painful.  The swelling has subsided but the pain continues.  He is unable to lift his shoulder and has weakness.  The pain radiates all the way to his hand and neck.  Patient has had improved symptoms with a course of oral steroids.  He is attending physical therapy at this time.      Review of Systems   Constitutional:  Negative for chills and fever.   HENT:  Negative for congestion, rhinorrhea and sore throat.    Respiratory:  Negative for cough and shortness of breath.    Cardiovascular:  Negative for chest pain.   Gastrointestinal:  Negative for diarrhea, nausea and vomiting.   Musculoskeletal:  Positive for neck pain and neck stiffness.   Skin:  Negative for rash.   Neurological:  Negative for dizziness and headaches.       Objective     /92 (BP Location: Right arm, Patient Position: Sitting, Cuff Size: Standard) Comment: pt didnt take htn medication  Pulse 78   Temp (!) 96.6 °F (35.9 °C) (Temporal)   Resp 18   Ht 5' 5\" (1.651 m)   Wt 67.1 kg (148 lb)   SpO2 99%   BMI " 24.63 kg/m²     Physical Exam  Vitals and nursing note reviewed.   Constitutional:       General: He is not in acute distress.     Appearance: He is well-developed.   HENT:      Head: Normocephalic and atraumatic.   Eyes:      Conjunctiva/sclera: Conjunctivae normal.   Cardiovascular:      Rate and Rhythm: Normal rate.      Heart sounds: No murmur heard.  Pulmonary:      Effort: Pulmonary effort is normal. No respiratory distress.   Musculoskeletal:         General: No swelling.      Right shoulder: Tenderness present. Decreased range of motion. Decreased strength.      Cervical back: Neck supple. Tenderness present. Pain with movement and muscular tenderness present. Decreased range of motion.   Skin:     General: Skin is warm and dry.   Neurological:      Mental Status: He is alert.   Psychiatric:         Mood and Affect: Mood normal.       Administrative Statements

## 2024-06-26 NOTE — PATIENT INSTRUCTIONS
Patient Education     Dolor de hombro   Conceptos Básicos   Redactado por los médicos y editores de [employer]   ¿Cuáles son las partes del hombro? -- La articulación del hombro está compuesta por el húmero, la clavícula y el omóplato. Incluye músculos, ligamentos, tendones y bolsas (figura 1). Todas estas partes trabajan juntas para ayudar a que el hombro se mueva cómodamente en diferentes direcciones.  ¿Qué puede causar el dolor de hombro? -- El dolor de hombro puede ocurrir cuando se daña o se lesiona alguna de las diferentes partes del hombro.  Existen distintos padecimientos que pueden causar dolor en el hombro. Son, entre otros:   Bursitis - Es un padecimiento que puede provocar dolor o inflamación cerca de martha articulación. Martha bolsa es un saco pequeño lleno de líquido que se encuentra cerca de un hueso. Amortigua y protege los tejidos cercanos cuando se frotan contra los huesos o se deslizan sobre estos. La bursitis se produce cuando martha de estas bolsas se irrita e inflama.   Hombro congelado - Es un padecimiento que hace que el hombro esté rígido, duela y sea difícil de . Si tiene hombro congelado, el tejido de alrededor de la articulación del hombro se vuelve grueso y josi. [place] podría suceder después de martha lesión o martha cirugía en el hombro.   Lesión del manguito rotador - El manguito rotador está compuesto por 4 músculos del hombro y kesha tendones. Los tendones son bandas de tejido resistente que conectan los músculos a los huesos. Las lesiones del manguito rotador causan dolor en el hombro y a veces en la parte superior del brazo.   Compresión del hombro - Ocurre cuando un músculo, un tendón o martha bolsa se comprime entre los huesos.   Hombro dislocado - Se produce cuando determinados ligamentos se rompen o se estiran demasiado. Los ligamentos son bandas de tejido eduin que conectan huesos entre sí. Las causas más comunes del hombro dislocado son caerse sobre el hombro y recibir un golpe en el hombro.  "El hombro dislocado puede ser leve o grave, según cuántos ligamentos se hayan roto.   Osteoartritis - Es un padecimiento común que con frecuencia aparece con la edad. El cartílago de las articulaciones se desgasta, lo que hace que los huesos se froten entre sí. [place] puede causar dolor, rigidez e inflamación en las articulaciones del hombro.  ¿Es necesario que me realice pruebas? -- Rudy vez. Anguiano médico o enfermero hablará con usted y le hará un examen. También es posible que le sreekanth un estudio de imagen del hombro, por ejemplo martha radiografía, martha resonancia magnética o un ultrasonido del hombro. Los estudios de imagen crean imágenes del interior del cuerpo.  ¿Cómo se trata el dolor de hombro? -- Muchas causas de dolor en el hombro mejoran solas, don puede tardar semanas o meses en curarse por completo.  El médico puede recomendar lo siguiente:   Medicinas para aliviar el dolor llamadas \"medicinas antiinflamatorias no esteroides\" (ANDRÉS) - Entre ellas se incluyen el ibuprofeno (ejemplos de marcas comerciales: Advil, Motrin) y el naproxeno (ejemplo de danielle comercial: Aleve). Pueden reducir el dolor y la inflamación.   Ejercicios y estiramientos - Puede ser útil que trabaje con un fisioterapeuta (experto en ejercicios). Puede enseñarle estiramientos suaves y ejercicios que lo ayuden con kesha síntomas. Siga los consejos del fisioterapeuta sobre cuándo hacer los ejercicios y con qué frecuencia.   Inyecciones de esteroides - Las medicinas esteroides ayudan a reducir la inflamación. Los médicos pueden inyectar esteroides en el hombro para ayudar a disminuir los síntomas.   Cirugía - La cirugía podría ser martha opción si otros tratamientos no funcionan y hace mucho tiempo que usted padece los síntomas.  ¿Hay algo que pueda hacer por mi cuenta para sentirme mejor?    Descanse el hombro - Evite estirar el brazo por encima de la rachelle o cruzarlo por el pecho, o dormir sobre el hombro dolorido. Si el médico le zain un cabestrillo " para apoyar el brazo, siga las instrucciones para usarlo. O en vez de un cabestrillo es posible que le coloquen martha venda alrededor de los hombros y de la parte superior de la espalda.   [place] medicinas para aliviar el dolor y la inflamación - Para tratar el dolor, puede rosibel paracetamol (acetaminofén) (ejemplo de danielle comercial: Tylenol). Para tratar el dolor y la inflamación, puede rosibel ibuprofeno (ejemplos de marcas comerciales: Advil, Motrin).   Coloque el hombro sobre almohadas - Manténgalo levantado por encima del nivel del corazón. [place] puede ayudar con el dolor y la inflamación.   Enfriar el hombro - Coloque un paquete de gel frío, martha bolsa de hielo o martha bolsa de verduras congeladas en la anali lesionada cada 1 a 2 horas, cindy 15 minutos cada vez. Coloque martha toalla ambika entre el hielo (o el objeto frío) y read piel. Aplique hielo (o cualquier objeto frío) cindy al menos 6 horas después de la lesión. Algunas personas prefieren aplicar hielo cindy más tiempo, incluso hasta dos días después de la lesión. El hielo también puede ser útil después de hacer ejercicios de hombro.   Aplique calor en la anali para aliviar el dolor y la rigidez - No lo aplique más de 20 minutos por vez. Además, no use nada demasiado caliente que pueda quemar la piel.   Sreekanth el ejercicio del péndulo para evitar que el hombro se ponga demasiado rígido (figura 2):   Relaje el brazo y déjelo colgando mientras está sentado o parado. Mueva suavemente el brazo:   Hacia adelante y hacia atrás   Hacia la derecha y hacia la izquierda   En pequeños círculos   Sreekanth charmaine ejercicio cindy 5 minutos, 1 o 2 veces por día.   Comience lentamente, y sreekanth ejercicios cada vez más difíciles gradualmente. Por ejemplo, sreekanth círculos pequeños con el brazo al principio. Con el tiempo, sreekanth círculos más grandes o sostenga pesas en la mano.   Read médico, enfermero o fisioterapeuta puede mostrarle cómo hacer otros ejercicios para fortalecer los músculos  alrededor del hombro. Le dirá cuándo empezarlos y con cuánta frecuencia hacerlos.   Antes de ejercitar el hombro, caliente mehnaz los músculos. Para ello puede darse martha ducha o un baño de [place], o usar martha almohadilla térmica. Es normal sentir algo de dolor. Si tiene un dolor intenso o desgarrador, o si el dolor empeora, deje de hacer lo que estaba haciendo e infórmeselo al médico o enfermero.  ¿Cuándo marie llamar al médico? -- Pida ayuda de emergencia de inmediato (en . [employer] y [place], llame al 9-1-1) si:   Tiene dolor de hombro y comienza a tener dificultad para respirar o molestias intensas en el pecho.  Llame al médico o enfermero para pedir consejo si:   Tiene un dolor muy intenso que no se felipe con medicinas.   La mano o el brazo se le debilita o se inflama.   Tiene los dedos de la mano entumecidos, con hormigueo, o de color anaya o destinee.  Todos los artículos se actualizan a medida que se descubre nueva evidencia y culmina nuestro proceso de evaluación por homólogos   Charmaine artículo se recuperó de UpToDate el: Apr 25, 2024.  Artículo [id number] Versión 1.0.es-419.1  Release: 32.4.2 - C32.114  © 2024 [employer], Inc. Todos los derechos reservados.  figura 1: Partes del hombro     Estas son las diferentes partes del hombro visto desde la parte delantera (A) y la parte trasera (B). La articulación del hombro está compuesta por el húmero, la clavícula y el omóplato. Los ligamentos, músculos y tendones ayudan a mantener la articulación en read lugar y le permiten  el brazo. Martha bolsa es un saco pequeño lleno de líquido que se encuentra cerca de un hueso. [place]igua y protege los tejidos cercanos cuando se frotan contra los huesos o se deslizan sobre estos.  [facility] [id number] Versión 1.0  figura 2: Ejercicios de péndulo     Relaje el brazo y déjelo colgando mientras está sentado o parado. Mueva suavemente el brazo hacia adelante y hacia atrás, luego de lado a lado y luego en círculos pequeños. [place] charmaine ejercicio  cindy 5 minutos, 1 o 2 veces por día. Puede intensificar el ejercicio haciendo movimientos más amplios o sujetando martha pesa pequeña en la mano.  Gráfico [id number] Versión 1.0  Exención de responsabilidad y uso de la información del consumidor   Descargo de responsabilidad: esta información generalizada es un resumen limitado de información sobre el diagnóstico, el tratamiento y/o los medicamentos. No pretende ser exhaustiva y se debe utilizar ra herramienta para ayudar al usuario a comprender y/o evaluar las posibles opciones de diagnóstico y tratamiento. No incluye toda la información sobre afecciones, tratamientos, medicamentos, efectos secundarios o riesgos puedan ser aplicables a un paciente específico. No tiene el propósito de servir ra recomendación médica ni de sustituir la recomendación médica, el diagnóstico o el tratamiento de un profesional de atención médica que se base en el examen y la evaluación de charmaine profesional de la yonas respecto a las circunstancias específicas y únicas del paciente. Los pacientes deben hablar con un profesional de atención médica para obtener información completa sobre read yonas, cuestiones médicas y opciones de tratamiento, incluidos los riesgos o los beneficios relacionados con el uso de medicamentos. Esta información no certifica que los tratamientos o medicamentos janet seguros, eficaces o estén aprobados para tratar a un paciente específico. Up[employer]Date[employer] Inc. y kesha afiliados renuncian a cualquier garantía o responsabilidad relacionada con esta información o el uso de la misma.El uso de esta información está sujeto a las Condiciones de uso, disponibles en https://www.HubCastuwer.com/en/know/clinical-effectiveness-terms. 2024© [employer] y kesha afiliados y/o licenciantes. Todos los derechos reservados.  Copyright   © 2024 [employer] Todos los derechos reservados.

## 2024-06-28 ENCOUNTER — OFFICE VISIT (OUTPATIENT)
Dept: PHYSICAL THERAPY | Facility: CLINIC | Age: 64
End: 2024-06-28
Payer: MEDICARE

## 2024-06-28 DIAGNOSIS — M25.521 RIGHT ELBOW PAIN: ICD-10-CM

## 2024-06-28 DIAGNOSIS — G89.29 CHRONIC RIGHT SHOULDER PAIN: ICD-10-CM

## 2024-06-28 DIAGNOSIS — M25.421 ELBOW EFFUSION, RIGHT: ICD-10-CM

## 2024-06-28 DIAGNOSIS — M25.511 RIGHT SHOULDER PAIN, UNSPECIFIED CHRONICITY: Primary | ICD-10-CM

## 2024-06-28 DIAGNOSIS — M25.511 CHRONIC RIGHT SHOULDER PAIN: ICD-10-CM

## 2024-06-28 PROCEDURE — 97110 THERAPEUTIC EXERCISES: CPT

## 2024-06-28 PROCEDURE — 97112 NEUROMUSCULAR REEDUCATION: CPT

## 2024-06-28 NOTE — PROGRESS NOTES
"Daily Note     Today's date: 2024  Patient name: Clifford Blum  : 1960  MRN: [id number]  Referring provider: Abby Hilton, *  Dx:   Encounter Diagnosis     ICD-10-CM    1. Right shoulder pain, unspecified chronicity  M25.511       2. Chronic right shoulder pain  M25.511     G89.29       3. Elbow effusion, right  M25.421       4. Right elbow pain  M25.521           Start Time: 08  Stop Time: 09  Total time in clinic (min): 40 minutes    Subjective: Pt reports that his shoulder is starting to feel slgihtly better coming into today's PT session, with less intense pain reported.       Objective: See treatment diary below      Assessment: Pt tolerated treatment well. Added therabar IR/ER and D2 flexion with red TB to improve the strength, endurance, and activation of shoulder RTC, scapular stabilization, and postural musculature. Pt was challenged by several new exercises and previously performed exercises, needed moderate cueing for form corrections throughout the PT session. Added cane shoulder extensions and BB shoulder stretch to improve both functional strength and ROM throughout the shoulder. Patient exhibited good technique with therapeutic exercises and would benefit from continued PT      Plan: Continue per plan of care.  Progress treatment as tolerated.       Precautions: HTN, Cervicalgia, R shoulder pain       Manuals           R shoulder PROM              R elbow PROM              R elbow IASTM              C/S STM  NV  PWK           Shoulder STM  PWK           Neuro Re-Ed            Isometrics  10x5\" flex,ER,IR 10x 5\" IR/ER           Row   3x10 10# 3x10 10#          Extensions  3x10 10# 3x10 10#          LPD   3x10 15# 3x10 15#          TB ER / IR    20x ea RTB          Serratus press              UT/LS Stt  3x30\"                         Ther Ex            Pulley  3'  3'/3' 3'/3'          Wall sides   10x3 w/ FR 15x 5\" FR          UBE            " "  Cane AAROM   10x 3\" 3# flex/ abd/er 10x 3\" 3# flex/abd          Cane extension    10x 3\" stand 3#          Crossbody Str   10x3\" ea          BB Towel Str   10x3\" ea          D2 Flexion   20x YTB           Wall push up              Ball on wall                           Ther Activity  6/24 6/28                                    Gait Training  6/24                                     Modalities  6/24           Ice  10'             Moist Heat  NV                    "

## 2024-07-01 ENCOUNTER — OFFICE VISIT (OUTPATIENT)
Dept: PHYSICAL THERAPY | Facility: CLINIC | Age: 64
End: 2024-07-01
Payer: MEDICARE

## 2024-07-01 DIAGNOSIS — M25.511 RIGHT SHOULDER PAIN, UNSPECIFIED CHRONICITY: Primary | ICD-10-CM

## 2024-07-01 DIAGNOSIS — G89.29 CHRONIC RIGHT SHOULDER PAIN: ICD-10-CM

## 2024-07-01 DIAGNOSIS — M25.421 ELBOW EFFUSION, RIGHT: ICD-10-CM

## 2024-07-01 DIAGNOSIS — M25.521 RIGHT ELBOW PAIN: ICD-10-CM

## 2024-07-01 DIAGNOSIS — M25.511 CHRONIC RIGHT SHOULDER PAIN: ICD-10-CM

## 2024-07-01 PROCEDURE — 97112 NEUROMUSCULAR REEDUCATION: CPT

## 2024-07-01 PROCEDURE — 97110 THERAPEUTIC EXERCISES: CPT

## 2024-07-01 NOTE — PROGRESS NOTES
"Daily Note     Today's date: 2024  Patient name: Clifford Blum  : 1960  MRN: [id number]  Referring provider: Abby Hilton, *  Dx:   Encounter Diagnosis     ICD-10-CM    1. Right shoulder pain, unspecified chronicity  M25.511       2. Chronic right shoulder pain  M25.511     G89.29       3. Elbow effusion, right  M25.421       4. Right elbow pain  M25.521           Start Time: 0950  Stop Time: 1035  Total time in clinic (min): 45 minutes    Subjective: Pt reports that his shoulder is starting to feel slgihtly better, but now has pain in the R UT and neck.        Objective: See treatment diary below      Assessment: Pt tolerated treatment well. Pt continues to show challenge with current exercise program. Pt shows moderate soft tissue restrictions in R UT.  Patient exhibited good technique with therapeutic exercises and would benefit from continued PT. Pt will benefit from further skilled PT to increase strength, flexibility and function. Continue to progress as able.       Plan: Continue per plan of care.  Progress treatment as tolerated.       Precautions: HTN, Cervicalgia, R shoulder pain       Manuals          R shoulder PROM              R elbow PROM              R elbow IASTM              C/S STM  NV  PWK  HY         Shoulder STM  PWK  HY         Neuro Re-Ed           Isometrics  10x5\" flex,ER,IR 10x 5\" IR/ER           Row   3x10 10# 3x10 10# 3x10 10#         Extensions  3x10 10# 3x10 10# 3x10 10#         LPD   3x10 15# 3x10 15# 3x10 15#         TB ER / IR    20x ea RTB 20x ea RTB         Serratus press              UT/LS Stt  3x30\"                         Ther Ex           Pulley  3'  3'/3' 3'/3' 3'/3'         Wall sides   10x3 w/ FR 15x 5\" FR 15x 5\" FR         UBE              Cane AAROM   10x 3\" 3# flex/ abd/er 10x 3\" 3# flex/abd 10x 3\" 3# flex/abd         Cane extension    10x 3\" stand 3# 10x 3\" stand 3#         Crossbody Str   10x3\" ea 10x3\" " "tawana         BB Towel Str   10x3\" tawana 10x3\" tawana         D2 Flexion   20x YTB  20x YTB          Wall push up              Ball on wall                           Ther Activity  6/24 6/28                                    Gait Training  6/24                                     Modalities  6/24           Ice  10'             Moist Heat  NV                    "

## 2024-07-08 ENCOUNTER — OFFICE VISIT (OUTPATIENT)
Dept: PHYSICAL THERAPY | Facility: CLINIC | Age: 64
End: 2024-07-08
Payer: MEDICARE

## 2024-07-08 DIAGNOSIS — G89.29 CHRONIC RIGHT SHOULDER PAIN: ICD-10-CM

## 2024-07-08 DIAGNOSIS — M25.421 ELBOW EFFUSION, RIGHT: ICD-10-CM

## 2024-07-08 DIAGNOSIS — M25.511 CHRONIC RIGHT SHOULDER PAIN: ICD-10-CM

## 2024-07-08 DIAGNOSIS — M25.521 RIGHT ELBOW PAIN: ICD-10-CM

## 2024-07-08 DIAGNOSIS — M25.511 RIGHT SHOULDER PAIN, UNSPECIFIED CHRONICITY: Primary | ICD-10-CM

## 2024-07-08 PROCEDURE — 97112 NEUROMUSCULAR REEDUCATION: CPT

## 2024-07-08 PROCEDURE — 97110 THERAPEUTIC EXERCISES: CPT

## 2024-07-08 NOTE — PROGRESS NOTES
"Daily Note     Today's date: 2024  Patient name: Clifford Blum  : 1960  MRN: [id number]  Referring provider: Abby Hilton, *  Dx:   Encounter Diagnosis     ICD-10-CM    1. Right shoulder pain, unspecified chronicity  M25.511       2. Chronic right shoulder pain  M25.511     G89.29       3. Elbow effusion, right  M25.421       4. Right elbow pain  M25.521           Start Time: 825  Stop Time: 905  Total time in clinic (min): 40 minutes    Subjective: Pt reports that his shoulder is starting to feel slgihtly better, but the pain comes and goes.        Objective: See treatment diary below      Assessment: Pt tolerated treatment well. Pt continues to show challenge with current exercise program. Pt shows improved ROM in R UT. Pt shows moderate stiffness in R scalenes with manual therapy.  Patient exhibited good technique with therapeutic exercises and would benefit from continued PT. Pt will benefit from further skilled PT to increase strength, flexibility and function. Continue to progress as able.       Plan: Continue per plan of care.  Progress treatment as tolerated.       Precautions: HTN, Cervicalgia, R shoulder pain       Manuals  7        R shoulder PROM              R elbow PROM              R elbow IASTM              C/S STM  NV  PWK  HY HY        Shoulder STM  PWK  HY HY        Neuro Re-Ed   7        Isometrics  10x5\" flex,ER,IR 10x 5\" IR/ER           Row   3x10 10# 3x10 10# 3x10 10# 3x10 10#        Extensions  3x10 10# 3x10 10# 3x10 10# 3x10 10#        LPD   3x10 15# 3x10 15# 3x10 15# 3x10 15#        TB ER / IR    20x ea RTB 20x ea RTB 20x ea RTB        Serratus press              UT/LS Stt  3x30\"                         Ther Ex   7        Pulley  3'  3'/3' 3'/3' 3'/3' 3'/3'        Wall sides   10x3 w/ FR 15x 5\" FR 15x 5\" FR 15x 5\" FR        UBE              Cane AAROM   10x 3\" 3# flex/ abd/er 10x 3\" 3# flex/abd 10x 3\" 3# flex/abd 10x " "3\" 3# flex/abd        Cane extension    10x 3\" stand 3# 10x 3\" stand 3# 10x 3\" stand 3#        Crossbody Str   10x3\" ea 10x3\" ea 10x3\" ea        BB Towel Str   10x3\" ea 10x3\" ea 10x3\" tawana        D2 Flexion   20x YTB  20x YTB  20x YTB        Wall push up              Ball on wall                           Ther Activity  6/24 6/28 7/8                                  Gait Training  6/24 7/8                                  Modalities  6/24 7/8        Ice  10'             Moist Heat  NV                    "

## 2024-07-10 ENCOUNTER — OFFICE VISIT (OUTPATIENT)
Dept: OBGYN CLINIC | Facility: MEDICAL CENTER | Age: 64
End: 2024-07-10
Payer: MEDICARE

## 2024-07-10 VITALS
WEIGHT: 151 LBS | SYSTOLIC BLOOD PRESSURE: 124 MMHG | HEIGHT: 65 IN | BODY MASS INDEX: 25.16 KG/M2 | HEART RATE: 64 BPM | DIASTOLIC BLOOD PRESSURE: 82 MMHG

## 2024-07-10 DIAGNOSIS — G89.29 CHRONIC RIGHT SHOULDER PAIN: Primary | ICD-10-CM

## 2024-07-10 DIAGNOSIS — M25.511 CHRONIC RIGHT SHOULDER PAIN: Primary | ICD-10-CM

## 2024-07-10 DIAGNOSIS — M75.81 TENDINITIS OF RIGHT ROTATOR CUFF: ICD-10-CM

## 2024-07-10 PROCEDURE — 99203 OFFICE O/P NEW LOW 30 MIN: CPT | Performed by: EMERGENCY MEDICINE

## 2024-07-10 PROCEDURE — 20610 DRAIN/INJ JOINT/BURSA W/O US: CPT | Performed by: EMERGENCY MEDICINE

## 2024-07-10 RX ORDER — TRIAMCINOLONE ACETONIDE 40 MG/ML
40 INJECTION, SUSPENSION INTRA-ARTICULAR; INTRAMUSCULAR
Status: COMPLETED | OUTPATIENT
Start: 2024-07-10 | End: 2024-07-10

## 2024-07-10 RX ORDER — ROPIVACAINE HYDROCHLORIDE 2 MG/ML
3 INJECTION, SOLUTION EPIDURAL; INFILTRATION; PERINEURAL
Status: SHIPPED | OUTPATIENT
Start: 2024-07-10

## 2024-07-10 RX ADMIN — ROPIVACAINE HYDROCHLORIDE 3 ML: 2 INJECTION, SOLUTION EPIDURAL; INFILTRATION; PERINEURAL at 09:00

## 2024-07-10 RX ADMIN — TRIAMCINOLONE ACETONIDE 40 MG: 40 INJECTION, SUSPENSION INTRA-ARTICULAR; INTRAMUSCULAR at 08:30

## 2024-07-10 NOTE — PROGRESS NOTES
Assessment/Plan:    Diagnoses and all orders for this visit:    Chronic right shoulder pain  -     Ambulatory Referral to Orthopedic Surgery  -     Large joint arthrocentesis: R subacromial bursa  -     ropivacaine (NAROPIN) injection 3 mL    Tendinitis of right rotator cuff  -     Large joint arthrocentesis: R subacromial bursa  -     ropivacaine (NAROPIN) injection 3 mL    Right SA CSI provided today  May continue PT or transition to HEP  Reviewed prior Xrays Shoulder and PT notes    Return in about 6 weeks (around 8/21/2024).      Subjective:   Patient ID: Clifford Blum is a 64 y.o. male.    NP presents for 8 months of Right shoulder pain.   Pt states that he is  and was carrying 75 lb boxes when pain began. Pt states that he has significant increase in pain and reduction in ROM.  Pain worse at night  He was prescribed MEDROL pack, provided Toradol IM, participating in PT        Review of Systems    The following portions of the patient's chart were reviewed and updated as appropriate:   Allergy:  No Known Allergies    Medications:    Current Outpatient Medications:     Diclofenac Sodium (VOLTAREN) 1 %, Apply 2 g topically 4 (four) times a day, Disp: 100 g, Rfl: 2    lisinopril (ZESTRIL) 20 mg tablet, Take 1 tablet (20 mg total) by mouth daily, Disp: 30 tablet, Rfl: 5    methylPREDNISolone 4 MG tablet therapy pack, Use as directed on package, Disp: 21 each, Rfl: 0    pantoprazole (PROTONIX) 40 mg tablet, Take 1 tablet (40 mg total) by mouth daily, Disp: 30 tablet, Rfl: 2    rosuvastatin (CRESTOR) 10 MG tablet, Take 1 tablet (10 mg total) by mouth daily, Disp: 30 tablet, Rfl: 5    Current Facility-Administered Medications:     ropivacaine (NAROPIN) injection 3 mL, 3 mL, Intra-articular, Titrated,     Patient Active Problem List   Diagnosis    Essential hypertension    Mixed hyperlipidemia    Cervicalgia    Macrocytic anemia    Stage 2 chronic kidney disease    Chronic right shoulder pain  "   Gastroesophageal reflux disease    Elbow effusion, right       Objective:  /82   Pulse 64   Ht 5' 5\" (1.651 m)   Wt 68.5 kg (151 lb)   BMI 25.13 kg/m²     Right Shoulder Exam     Range of Motion   Active abduction:  abnormal   External rotation:  normal   Internal rotation 0 degrees:  abnormal     Muscle Strength   External rotation: 4/5     Tests   Drop arm: negative      Left Shoulder Exam     Range of Motion   Active abduction:  normal   External rotation:  normal     Muscle Strength   External rotation: 5/5              Physical Exam      Neurologic Exam    Large joint arthrocentesis: R subacromial bursa  Universal Protocol:  Consent: Verbal consent obtained.  Risks and benefits: risks, benefits and alternatives were discussed  Consent given by: patient  Time out: Immediately prior to procedure a \"time out\" was called to verify the correct patient, procedure, equipment, support staff and site/side marked as required.  Timeout called at: 7/10/2024 8:50 AM.  Patient understanding: patient states understanding of the procedure being performed  Test results: test results available and properly labeled  Site marked: the operative site was marked  Patient identity confirmed: verbally with patient  Supporting Documentation  Indications: pain   Procedure Details  Location: shoulder - R subacromial bursa  Preparation: Patient was prepped and draped in the usual sterile fashion  Needle size: 22 G  Ultrasound guidance: no  Approach: posterolateral  Medications administered: 40 mg triamcinolone acetonide 40 mg/mL    Patient tolerance: patient tolerated the procedure well with no immediate complications  Dressing:  Sterile dressing applied    No erythema of Shoulder(s)          I have personally reviewed the written report of the pertinent studies. Xrays Shoulder            Past Medical History:   Diagnosis Date    Anemia     Chronic kidney disease     Colon polyp     Hyperlipidemia     Hypertension     " Suprapubic tenderness 06/22/2023       History reviewed. No pertinent surgical history.    Social History     Socioeconomic History    Marital status: /Civil Union     Spouse name: Not on file    Number of children: Not on file    Years of education: Not on file    Highest education level: Not on file   Occupational History    Not on file   Tobacco Use    Smoking status: Never     Passive exposure: Never    Smokeless tobacco: Never   Vaping Use    Vaping status: Never Used   Substance and Sexual Activity    Alcohol use: Yes     Comment: Social    Drug use: Never    Sexual activity: Not on file   Other Topics Concern    Not on file   Social History Narrative    Not on file     Social Determinants of Health     Financial Resource Strain: Medium Risk (5/29/2024)    Overall Financial Resource Strain (CARDIA)     Difficulty of Paying Living Expenses: Somewhat hard   Food Insecurity: Food Insecurity Present (5/29/2024)    Hunger Vital Sign     Worried About Running Out of Food in the Last Year: Often true     Ran Out of Food in the Last Year: Often true   Transportation Needs: Unmet Transportation Needs (5/29/2024)    PRAPARE - Transportation     Lack of Transportation (Medical): Yes     Lack of Transportation (Non-Medical): Yes   Physical Activity: Not on file   Stress: Not on file   Social Connections: Not on file   Intimate Partner Violence: Not on file   Housing Stability: Low Risk  (5/29/2024)    Housing Stability Vital Sign     Unable to Pay for Housing in the Last Year: No     Number of Times Moved in the Last Year: 1     Homeless in the Last Year: No       History reviewed. No pertinent family history.

## 2024-07-10 NOTE — PATIENT INSTRUCTIONS
You may use Advil (ibuprofen) 400-600mg every 6 hours or at least twice per day (OR Aleve (naproxen) 250-500mg every 12 hours as needed for pain and inflammation).  However do not mix or take other NSAIDs together such as Advil, Motrin, ibuprofen, Celebrex, naproxen, diclofenac or Aleve.    You may also take Tylenol (acetaminophen) together with Advil (ibuprofen) or Aleve (naproxen) as this is safe and can help decrease your pain levels.  The dosing for Tylenol is 500mg every 4-6 hours as needed OR max 1,000mg per dose up to 3 times per day for a total of 3,000mg per day  *Check with your primary care physician to see if these medications are safe to take and to make sure they do not interfere with your other medications and medical issues.

## 2024-07-12 ENCOUNTER — OFFICE VISIT (OUTPATIENT)
Dept: PHYSICAL THERAPY | Facility: CLINIC | Age: 64
End: 2024-07-12
Payer: MEDICARE

## 2024-07-12 DIAGNOSIS — M25.421 ELBOW EFFUSION, RIGHT: ICD-10-CM

## 2024-07-12 DIAGNOSIS — M25.511 CHRONIC RIGHT SHOULDER PAIN: ICD-10-CM

## 2024-07-12 DIAGNOSIS — M25.521 RIGHT ELBOW PAIN: ICD-10-CM

## 2024-07-12 DIAGNOSIS — G89.29 CHRONIC RIGHT SHOULDER PAIN: ICD-10-CM

## 2024-07-12 DIAGNOSIS — M25.511 RIGHT SHOULDER PAIN, UNSPECIFIED CHRONICITY: Primary | ICD-10-CM

## 2024-07-12 PROCEDURE — 97110 THERAPEUTIC EXERCISES: CPT

## 2024-07-12 NOTE — PROGRESS NOTES
"Daily Note     Today's date: 2024  Patient name: Clifford Blum  : 1960  MRN: [id number]  Referring provider: Abby Hilton, *  Dx:   Encounter Diagnosis     ICD-10-CM    1. Right shoulder pain, unspecified chronicity  M25.511       2. Chronic right shoulder pain  M25.511     G89.29       3. Elbow effusion, right  M25.421       4. Right elbow pain  M25.521           Start Time: 823  Stop Time: 853  Total time in clinic (min): 30 minutes    Subjective: Pt obtained injection in R subacromial bursa completed on 7/10, by Dr. Han he notes sig improvement in sx since procedure.       Objective: See treatment diary below      Assessment: Tolerated treatment well. He is given reminders on proper reps/sets throughout session. He denies any elevation in sx during treatment session. Patient exhibited good technique with therapeutic exercises and would benefit from continued PT      Plan: Continue per plan of care.      Precautions: HTN, Cervicalgia, R shoulder pain       Manuals        R shoulder PROM              R elbow PROM              R elbow IASTM              C/S STM  NV  PWK  HY HY        Shoulder STM  PWK  HY HY        Neuro Re-Ed         Isometrics  10x5\" flex,ER,IR 10x 5\" IR/ER           Row   3x10 10# 3x10 10# 3x10 10# 3x10 10# 3x10 10#       Extensions  3x10 10# 3x10 10# 3x10 10# 3x10 10# 3x10       LPD   3x10 15# 3x10 15# 3x10 15# 3x10 15# 3x10 15#       TB ER / IR    20x ea RTB 20x ea RTB 20x ea RTB 20x ea RTB       Serratus press              UT/LS Stt  3x30\"                         Ther Ex         Pulley  3'  3'/3' 3'/3' 3'/3' 3'/3' 3'/3'       Wall sides   10x3 w/ FR 15x 5\" FR 15x 5\" FR 15x 5\" FR 15x FR       UBE              Cane AAROM   10x 3\" 3# flex/ abd/er 10x 3\" 3# flex/abd 10x 3\" 3# flex/abd 10x 3\" 3# flex/abd 15x flex/abd no # Stand       Cane extension    10x 3\" stand 3# 10x 3\" stand 3# 10x 3\" stand 3# " "15x no #       Crossbody Str   10x3\" ea 10x3\" ea 10x3\" ea 10x3\" ea       BB Towel Str   10x3\" ea 10x3\" ea 10x3\" ea 10x3\" ea       D2 Flexion   20x YTB  20x YTB  20x YTB 20x YTB       Wall push up              Ball on wall                           Ther Activity  6/24 6/28 7/8 7/12                                 Gait Training  6/24 7/8 7/12                                 Modalities  6/24 7/8        Ice  10'             Moist Heat  NV                      "

## 2024-07-27 DIAGNOSIS — K21.9 GASTROESOPHAGEAL REFLUX DISEASE, UNSPECIFIED WHETHER ESOPHAGITIS PRESENT: ICD-10-CM

## 2024-07-29 RX ORDER — PANTOPRAZOLE SODIUM 40 MG/1
40 TABLET, DELAYED RELEASE ORAL DAILY
Qty: 30 TABLET | Refills: 2 | Status: SHIPPED | OUTPATIENT
Start: 2024-07-29

## 2024-09-16 ENCOUNTER — OFFICE VISIT (OUTPATIENT)
Dept: FAMILY MEDICINE CLINIC | Facility: CLINIC | Age: 64
End: 2024-09-16

## 2024-09-16 VITALS
DIASTOLIC BLOOD PRESSURE: 92 MMHG | RESPIRATION RATE: 18 BRPM | OXYGEN SATURATION: 98 % | BODY MASS INDEX: 25.14 KG/M2 | TEMPERATURE: 98 F | HEART RATE: 78 BPM | WEIGHT: 150.9 LBS | HEIGHT: 65 IN | SYSTOLIC BLOOD PRESSURE: 124 MMHG

## 2024-09-16 DIAGNOSIS — N18.2 STAGE 2 CHRONIC KIDNEY DISEASE: ICD-10-CM

## 2024-09-16 DIAGNOSIS — Z11.59 ENCOUNTER FOR HEPATITIS C SCREENING TEST FOR LOW RISK PATIENT: ICD-10-CM

## 2024-09-16 DIAGNOSIS — G89.29 CHRONIC RIGHT SHOULDER PAIN: ICD-10-CM

## 2024-09-16 DIAGNOSIS — M25.511 CHRONIC RIGHT SHOULDER PAIN: ICD-10-CM

## 2024-09-16 DIAGNOSIS — E53.8 B12 DEFICIENCY: ICD-10-CM

## 2024-09-16 DIAGNOSIS — Z13.1 SCREENING FOR DIABETES MELLITUS: ICD-10-CM

## 2024-09-16 DIAGNOSIS — Z11.4 ENCOUNTER FOR SCREENING FOR HIV: ICD-10-CM

## 2024-09-16 DIAGNOSIS — K21.9 GASTROESOPHAGEAL REFLUX DISEASE WITHOUT ESOPHAGITIS: ICD-10-CM

## 2024-09-16 DIAGNOSIS — I10 ESSENTIAL HYPERTENSION: Primary | ICD-10-CM

## 2024-09-16 DIAGNOSIS — E78.2 MIXED HYPERLIPIDEMIA: ICD-10-CM

## 2024-09-16 DIAGNOSIS — D69.6 PLATELETS DECREASED (HCC): ICD-10-CM

## 2024-09-16 DIAGNOSIS — I10 HYPERTENSION GOAL BP (BLOOD PRESSURE) < 150/90: ICD-10-CM

## 2024-09-16 PROCEDURE — 99214 OFFICE O/P EST MOD 30 MIN: CPT | Performed by: NURSE PRACTITIONER

## 2024-09-16 RX ORDER — SENNOSIDES 8.6 MG
650 CAPSULE ORAL EVERY 8 HOURS PRN
Qty: 30 TABLET | Refills: 4 | Status: SHIPPED | OUTPATIENT
Start: 2024-09-16

## 2024-09-16 RX ORDER — LISINOPRIL 40 MG/1
40 TABLET ORAL DAILY
Qty: 90 TABLET | Refills: 0 | Status: SHIPPED | OUTPATIENT
Start: 2024-09-16

## 2024-09-16 NOTE — ASSESSMENT & PLAN NOTE
Stable, continue PPI  -Discussed diet and lifestyle interventions to improve sx including: avoidance of common triggers (chocolate, caffeine, tomatoes, citrus), eat small meals frequently, remain upright after meals

## 2024-09-16 NOTE — ASSESSMENT & PLAN NOTE
Lab Results   Component Value Date    EGFR 75 04/15/2024    EGFR 71 06/29/2023    EGFR 80 10/16/2018    CREATININE 1.03 04/15/2024    CREATININE 1.09 06/29/2023    CREATININE 1.02 10/16/2018     Stable, avoid NSAIDs    Orders:    CBC and differential; Future    Comprehensive metabolic panel; Future    Hemoglobin A1C; Future

## 2024-09-16 NOTE — ASSESSMENT & PLAN NOTE
Lab Results   Component Value Date    CHOLESTEROL 226 (H) 04/15/2024    CHOLESTEROL 171 10/16/2018     Lab Results   Component Value Date    HDL 69 04/15/2024    HDL 44 10/16/2018     Lab Results   Component Value Date    TRIG 130 04/15/2024    TRIG 124 10/16/2018     Lab Results   Component Value Date    NONHDLC 127 10/16/2018     Lab Results   Component Value Date    LDLCALC 131 (H) 04/15/2024     Continue rosuvastatin     Orders:    Lipid panel; Future

## 2024-09-16 NOTE — ASSESSMENT & PLAN NOTE
BP slightly above goal and above goal last several encounters   Recommend increase lisinopril to 40 mg daily     Orders:    CBC and differential; Future    Comprehensive metabolic panel; Future    Hemoglobin A1C; Future

## 2024-09-16 NOTE — ASSESSMENT & PLAN NOTE
Previously received injection with good results, he will continue follow up with ortho   Orders:    acetaminophen (TYLENOL) 650 mg CR tablet; Take 1 tablet (650 mg total) by mouth every 8 (eight) hours as needed for mild pain    Ambulatory Referral to Orthopedic Surgery; Future

## 2024-09-16 NOTE — PROGRESS NOTES
Ambulatory Visit  Name: Clifford Blum      : 1960      MRN: [id number]  Encounter Provider: LYDIA Bennett  Encounter Date: 2024   Encounter department: [facility]    Assessment & Plan  Hypertension goal BP (blood pressure) < 150/90    Orders:    lisinopril (ZESTRIL) 40 mg tablet; Take 1 tablet (40 mg total) by mouth daily    Essential hypertension  BP slightly above goal and above goal last several encounters   Recommend increase lisinopril to 40 mg daily     Orders:    CBC and differential; Future    Comprehensive metabolic panel; Future    Hemoglobin A1C; Future    Stage 2 chronic kidney disease  Lab Results   Component Value Date    EGFR 75 04/15/2024    EGFR 71 2023    EGFR 80 10/16/2018    CREATININE 1.03 04/15/2024    CREATININE 1.09 2023    CREATININE 1.02 10/16/2018     Stable, avoid NSAIDs    Orders:    CBC and differential; Future    Comprehensive metabolic panel; Future    Hemoglobin A1C; Future    Gastroesophageal reflux disease without esophagitis  Stable, continue PPI  -Discussed diet and lifestyle interventions to improve sx including: avoidance of common triggers (chocolate, caffeine, tomatoes, citrus), eat small meals frequently, remain upright after meals            Mixed hyperlipidemia  Lab Results   Component Value Date    CHOLESTEROL 226 (H) 04/15/2024    CHOLESTEROL 171 10/16/2018     Lab Results   Component Value Date    HDL 69 04/15/2024    HDL 44 10/16/2018     Lab Results   Component Value Date    TRIG 130 04/15/2024    TRIG 124 10/16/2018     Lab Results   Component Value Date    NONHDLC 127 10/16/2018     Lab Results   Component Value Date    LDLCALC 131 (H) 04/15/2024     Continue rosuvastatin     Orders:    Lipid panel; Future    Chronic right shoulder pain  Previously received injection with good results, he will continue follow up with ortho   Orders:    acetaminophen (TYLENOL) 650 mg CR tablet; Take 1 tablet  (650 mg total) by mouth every 8 (eight) hours as needed for mild pain    Ambulatory Referral to Orthopedic Surgery; Future    Platelets decreased (HCC)    Orders:    CBC and differential; Future    B12 deficiency    Orders:    Vitamin B12; Future    Screening for diabetes mellitus    Orders:    Hemoglobin A1C; Future    Encounter for screening for HIV    Orders:    HIV 1/2 AG/AB w Reflex [facility] for 2 yr old and above; Future    Encounter for hepatitis C screening test for low risk patient    Orders:    Hepatitis C antibody; Future    BMI Counseling: Body mass index is 25.11 kg/m². The BMI is above normal. Nutrition recommendations include decreasing portion sizes, encouraging healthy choices of fruits and vegetables, consuming healthier snacks and limiting drinks that contain sugar. Exercise recommendations include exercising 3-5 times per week. Rationale for BMI follow-up plan is due to patient being overweight or obese.         History of Present Illness     Patient is a 65 YO male who  has a past medical history of Anemia, Chronic kidney disease, Colon polyp, Hyperlipidemia, Hypertension, and Suprapubic tenderness (06/22/2023) who presents today for follow up of right shoulder pain.     Shoulder Pain   The pain is present in the right shoulder. This is a chronic problem. The current episode started more than 1 month ago. There has been no history of extremity trauma. The problem occurs intermittently. The problem has been waxing and waning. The quality of the pain is described as aching. The pain is at a severity of 8/10. The pain is moderate. Associated symptoms include joint swelling, a limited range of motion, numbness and stiffness. Pertinent negatives include no fever, inability to bear weight, itching, joint locking or tingling. He has tried acetaminophen for the symptoms. The treatment provided mild relief. Family history does not include gout or rheumatoid arthritis. There is no history of diabetes, gout,  osteoarthritis or rheumatoid arthritis.     Review of Systems   Constitutional:  Negative for chills and fever.   HENT:  Negative for ear pain and sore throat.    Eyes:  Negative for pain and visual disturbance.   Respiratory:  Negative for cough and shortness of breath.    Cardiovascular:  Negative for chest pain and palpitations.   Gastrointestinal:  Negative for abdominal pain and vomiting.   Genitourinary:  Negative for dysuria and hematuria.   Musculoskeletal:  Positive for arthralgias, joint swelling and stiffness. Negative for back pain and gout.   Skin:  Negative for color change, itching and rash.   Neurological:  Positive for numbness. Negative for tingling, seizures and syncope.   All other systems reviewed and are negative.    Past Medical History:   Diagnosis Date    Anemia     Chronic kidney disease     Colon polyp     Hyperlipidemia     Hypertension     Suprapubic tenderness 06/22/2023     No past surgical history on file.  No family history on file.  Social History     Tobacco Use    Smoking status: Never     Passive exposure: Never    Smokeless tobacco: Never   Vaping Use    Vaping status: Never Used   Substance and Sexual Activity    Alcohol use: Yes     Comment: Social    Drug use: Never    Sexual activity: Not on file     Current Outpatient Medications on File Prior to Visit   Medication Sig    Diclofenac Sodium (VOLTAREN) 1 % Apply 2 g topically 4 (four) times a day    pantoprazole (PROTONIX) 40 mg tablet TAKE 1 TABLET BY MOUTH EVERY DAY    rosuvastatin (CRESTOR) 10 MG tablet Take 1 tablet (10 mg total) by mouth daily     No Known Allergies  Immunization History   Administered Date(s) Administered    INFLUENZA 11/29/2016    Influenza, recombinant, quadrivalent,injectable, preservative free 10/11/2018    Tdap 10/11/2018     Objective     /92 (BP Location: Right arm, Patient Position: Sitting, Cuff Size: Standard) Comment: Pt took bp meds 3 hrs ago  Pulse 78   Temp 98 °F (36.7 °C)  "(Temporal)   Resp 18   Ht 5' 5\" (1.651 m)   Wt 68.4 kg (150 lb 14.4 oz)   SpO2 98%   BMI 25.11 kg/m²     Physical Exam  Vitals and nursing note reviewed.   Constitutional:       General: He is not in acute distress.     Appearance: Normal appearance. He is not diaphoretic.   HENT:      Head: Normocephalic and atraumatic.      Right Ear: External ear normal.      Left Ear: External ear normal.   Eyes:      General:         Right eye: No discharge.         Left eye: No discharge.      Conjunctiva/sclera: Conjunctivae normal.   Cardiovascular:      Rate and Rhythm: Normal rate and regular rhythm.   Pulmonary:      Effort: Pulmonary effort is normal. No respiratory distress.      Breath sounds: Normal breath sounds.   Musculoskeletal:      Right shoulder: Tenderness present. Decreased range of motion.      Right elbow: Effusion present. Tenderness present in medial epicondyle.      Cervical back: Normal range of motion and neck supple.   Skin:     General: Skin is warm and dry.   Neurological:      Mental Status: He is alert and oriented to person, place, and time.   Psychiatric:         Mood and Affect: Mood normal.         Behavior: Behavior normal.         "

## 2024-09-19 ENCOUNTER — OFFICE VISIT (OUTPATIENT)
Dept: OBGYN CLINIC | Facility: MEDICAL CENTER | Age: 64
End: 2024-09-19
Payer: MEDICARE

## 2024-09-19 VITALS
DIASTOLIC BLOOD PRESSURE: 76 MMHG | HEART RATE: 74 BPM | HEIGHT: 65 IN | SYSTOLIC BLOOD PRESSURE: 124 MMHG | BODY MASS INDEX: 24.99 KG/M2 | WEIGHT: 150 LBS

## 2024-09-19 DIAGNOSIS — G89.29 CHRONIC RIGHT SHOULDER PAIN: Primary | ICD-10-CM

## 2024-09-19 DIAGNOSIS — M25.511 CHRONIC RIGHT SHOULDER PAIN: Primary | ICD-10-CM

## 2024-09-19 DIAGNOSIS — M75.81 TENDINITIS OF RIGHT ROTATOR CUFF: ICD-10-CM

## 2024-09-19 PROCEDURE — 99213 OFFICE O/P EST LOW 20 MIN: CPT | Performed by: EMERGENCY MEDICINE

## 2024-09-19 NOTE — PROGRESS NOTES
Assessment/Plan:    Diagnoses and all orders for this visit:    Chronic right shoulder pain  -     Ambulatory Referral to Orthopedic Surgery  -     MRI shoulder right wo contrast; Future    Tendinitis of right rotator cuff  -     MRI shoulder right wo contrast; Future    MRI Right shoulder r/o RTC as he continues with pain despite CSI and PT    Return for Follow Up After Imaging Study.      Subjective:   Patient ID: Clifford Blum is a 64 y.o. male.    Patient returns having had significant improvement with SA CSI 98% improvement however his pain is returning.  It seems to be worse at night.  Limited ROM right shoulder.    Notes minimal symptoms Left shoulder  Taking Acetaminophen, has Diclofenac gel    Initial note:  NP presents for 8 months of Right shoulder pain.   Pt states that he is  and was carrying 75 lb boxes when pain began. Pt states that he has significant increase in pain and reduction in ROM.  Pain worse at night  He was prescribed MEDROL pack, provided Toradol IM, participating in PT        Review of Systems    The following portions of the patient's chart were reviewed and updated as appropriate:   Allergy:  No Known Allergies    Medications:    Current Outpatient Medications:     acetaminophen (TYLENOL) 650 mg CR tablet, Take 1 tablet (650 mg total) by mouth every 8 (eight) hours as needed for mild pain, Disp: 30 tablet, Rfl: 4    Diclofenac Sodium (VOLTAREN) 1 %, Apply 2 g topically 4 (four) times a day, Disp: 100 g, Rfl: 2    lisinopril (ZESTRIL) 40 mg tablet, Take 1 tablet (40 mg total) by mouth daily, Disp: 90 tablet, Rfl: 0    pantoprazole (PROTONIX) 40 mg tablet, TAKE 1 TABLET BY MOUTH EVERY DAY, Disp: 30 tablet, Rfl: 2    rosuvastatin (CRESTOR) 10 MG tablet, Take 1 tablet (10 mg total) by mouth daily, Disp: 30 tablet, Rfl: 5    Current Facility-Administered Medications:     ropivacaine (NAROPIN) injection 3 mL, 3 mL, Intra-articular, Titrated, , 3 mL at 07/10/24  "0900    Patient Active Problem List   Diagnosis    Essential hypertension    Mixed hyperlipidemia    Cervicalgia    Macrocytic anemia    Stage 2 chronic kidney disease    Chronic right shoulder pain    Gastroesophageal reflux disease    Elbow effusion, right    Platelets decreased (HCC)       Objective:  /76   Pulse 74   Ht 5' 5\" (1.651 m)   Wt 68 kg (150 lb)   BMI 24.96 kg/m²     Right Shoulder Exam     Range of Motion   Active abduction:  abnormal   External rotation:  normal   Internal rotation 0 degrees:  abnormal     Muscle Strength   External rotation: 4/5     Tests   Drop arm: negative      Left Shoulder Exam     Range of Motion   Active abduction:  normal   External rotation:  normal     Muscle Strength   External rotation: 5/5              Physical Exam      Neurologic Exam    Procedures    I have personally reviewed the written report of the pertinent studies.             Past Medical History:   Diagnosis Date    Anemia     Chronic kidney disease     Colon polyp     Hyperlipidemia     Hypertension     Suprapubic tenderness 06/22/2023       History reviewed. No pertinent surgical history.    Social History     Socioeconomic History    Marital status: /Civil Union     Spouse name: Not on file    Number of children: Not on file    Years of education: Not on file    Highest education level: Not on file   Occupational History    Not on file   Tobacco Use    Smoking status: Never     Passive exposure: Never    Smokeless tobacco: Never   Vaping Use    Vaping status: Never Used   Substance and Sexual Activity    Alcohol use: Yes     Comment: Social    Drug use: Never    Sexual activity: Not on file   Other Topics Concern    Not on file   Social History Narrative    Not on file     Social Determinants of Health     Financial Resource Strain: Medium Risk (5/29/2024)    Overall Financial Resource Strain (CARDIA)     Difficulty of Paying Living Expenses: Somewhat hard   Food Insecurity: Food " Insecurity Present (5/29/2024)    Hunger Vital Sign     Worried About Running Out of Food in the Last Year: Often true     Ran Out of Food in the Last Year: Often true   Transportation Needs: Unmet Transportation Needs (5/29/2024)    PRAPARE - Transportation     Lack of Transportation (Medical): Yes     Lack of Transportation (Non-Medical): Yes   Physical Activity: Not on file   Stress: Not on file   Social Connections: Not on file   Intimate Partner Violence: Not on file   Housing Stability: Low Risk  (5/29/2024)    Housing Stability Vital Sign     Unable to Pay for Housing in the Last Year: No     Number of Times Moved in the Last Year: 1     Homeless in the Last Year: No       History reviewed. No pertinent family history.

## 2024-09-19 NOTE — PATIENT INSTRUCTIONS
While using Diclofenac (Voltaren) gel, do not take any other NSAIDs such as Meloxicam, Advil, Motrin, ibuprofen, naproxen, diclofenac or Aleve.  However you may take Tylenol.  You may also take Tylenol 500mg every 4-6 hours as needed OR max 1,000mg per dose up to 3 times per day for a total of 3,000mg per day

## 2024-10-17 ENCOUNTER — OFFICE VISIT (OUTPATIENT)
Dept: FAMILY MEDICINE CLINIC | Facility: CLINIC | Age: 64
End: 2024-10-17

## 2024-10-17 VITALS
BODY MASS INDEX: 24.87 KG/M2 | SYSTOLIC BLOOD PRESSURE: 122 MMHG | WEIGHT: 149.3 LBS | DIASTOLIC BLOOD PRESSURE: 86 MMHG | HEIGHT: 65 IN | TEMPERATURE: 98.1 F | RESPIRATION RATE: 18 BRPM | HEART RATE: 76 BPM | OXYGEN SATURATION: 99 %

## 2024-10-17 DIAGNOSIS — G89.29 CHRONIC RIGHT SHOULDER PAIN: ICD-10-CM

## 2024-10-17 DIAGNOSIS — M25.421 ELBOW EFFUSION, RIGHT: ICD-10-CM

## 2024-10-17 DIAGNOSIS — I10 HYPERTENSION GOAL BP (BLOOD PRESSURE) < 150/90: ICD-10-CM

## 2024-10-17 DIAGNOSIS — I10 ESSENTIAL HYPERTENSION: Primary | ICD-10-CM

## 2024-10-17 DIAGNOSIS — E78.2 MIXED HYPERLIPIDEMIA: ICD-10-CM

## 2024-10-17 DIAGNOSIS — M25.511 CHRONIC RIGHT SHOULDER PAIN: ICD-10-CM

## 2024-10-17 PROCEDURE — 99214 OFFICE O/P EST MOD 30 MIN: CPT | Performed by: NURSE PRACTITIONER

## 2024-10-17 RX ORDER — LISINOPRIL 40 MG/1
40 TABLET ORAL DAILY
Qty: 90 TABLET | Refills: 0 | Status: SHIPPED | OUTPATIENT
Start: 2024-10-17

## 2024-10-17 RX ORDER — ROSUVASTATIN CALCIUM 10 MG/1
10 TABLET, COATED ORAL DAILY
Qty: 30 TABLET | Refills: 5 | Status: SHIPPED | OUTPATIENT
Start: 2024-10-17

## 2024-10-17 NOTE — PROGRESS NOTES
Ambulatory Visit  Name: Clifford Blum      : 1960      MRN: [id number]  Encounter Provider: LYDIA Bennett  Encounter Date: 10/17/2024   Encounter department: [facility]    Assessment & Plan  Chronic right shoulder pain  Seen by ortho 24 and prescribed medrol pack, IM Toradol which patient notes today was helpful. Pain is still present and gradually worsening.   He is scheduled for an MRI 10/24/24. Encouraged him to not miss this appointment as it is essential for work-up and to direct future treatment recommendations.   Continue management by orthopedics    Orders:    Diclofenac Sodium (VOLTAREN) 1 %; Apply 2 g topically 4 (four) times a day    Essential hypertension  BP at goal today 122/86. Lisinopril was increased to 40 mg at last visit 24 and appears to be working. Continue at current dose.     Orders:    lisinopril (ZESTRIL) 40 mg tablet; Take 1 tablet (40 mg total) by mouth daily       History of Present Illness     Patient is a 63 YO male who  has a past medical history of Anemia, Chronic kidney disease, Colon polyp, Hyperlipidemia, Hypertension, who presents today for follow up of right shoulder pain.     Shoulder Pain   The pain is present in the right shoulder. This is a chronic problem. The current episode started more than 1 month ago. There has been no history of extremity trauma. The problem occurs intermittently. The problem has been gradually worsening. The quality of the pain is described as aching. The pain is at a severity of 8/10. The pain is moderate. Associated symptoms include a limited range of motion, numbness, stiffness and tingling. Pertinent negatives include no fever, inability to bear weight, itching, joint locking or joint swelling. The symptoms are aggravated by activity. He has tried acetaminophen for the symptoms. The treatment provided no relief. Family history does not include gout or rheumatoid arthritis. There  is no history of diabetes, gout, osteoarthritis or rheumatoid arthritis.       History obtained from : patient and use of Zyga .  Review of Systems   Constitutional:  Negative for chills and fever.   HENT:  Negative for sore throat.    Respiratory:  Negative for cough and shortness of breath.    Cardiovascular:  Negative for chest pain and palpitations.   Gastrointestinal:  Negative for abdominal pain.   Musculoskeletal:  Positive for stiffness. Negative for gout.        Shoulder pain   Skin:  Negative for color change, itching and rash.   Neurological:  Positive for tingling and numbness. Negative for seizures and syncope.   All other systems reviewed and are negative.    Pertinent Medical History   Past Medical History:   Diagnosis Date    Anemia     Chronic kidney disease     Colon polyp     Hyperlipidemia     Hypertension     Suprapubic tenderness 06/22/2023       Medical History Reviewed by provider this encounter:  Tobacco  Allergies  Meds  Problems  Med Hx  Surg Hx  Fam Hx       Current Outpatient Medications on File Prior to Visit   Medication Sig Dispense Refill    acetaminophen (TYLENOL) 650 mg CR tablet Take 1 tablet (650 mg total) by mouth every 8 (eight) hours as needed for mild pain 30 tablet 4    pantoprazole (PROTONIX) 40 mg tablet TAKE 1 TABLET BY MOUTH EVERY DAY 30 tablet 2    [DISCONTINUED] Diclofenac Sodium (VOLTAREN) 1 % Apply 2 g topically 4 (four) times a day 100 g 2    [DISCONTINUED] lisinopril (ZESTRIL) 40 mg tablet Take 1 tablet (40 mg total) by mouth daily 90 tablet 0    [DISCONTINUED] rosuvastatin (CRESTOR) 10 MG tablet Take 1 tablet (10 mg total) by mouth daily 30 tablet 5     Current Facility-Administered Medications on File Prior to Visit   Medication Dose Route Frequency Provider Last Rate Last Admin    ropivacaine (NAROPIN) injection 3 mL  3 mL Intra-articular Titrated    3 mL at 07/10/24 0900      Social History     Tobacco Use    Smoking status: Never      "Passive exposure: Never    Smokeless tobacco: Never   Vaping Use    Vaping status: Never Used   Substance and Sexual Activity    Alcohol use: Yes     Comment: Social    Drug use: Never    Sexual activity: Not on file         Objective     /86 (BP Location: Right arm, Patient Position: Sitting, Cuff Size: Standard)   Pulse 76   Temp 98.1 °F (36.7 °C) (Temporal)   Resp 18   Ht 5' 5\" (1.651 m)   Wt 67.7 kg (149 lb 4.8 oz)   SpO2 99%   BMI 24.84 kg/m²     Physical Exam  Vitals and nursing note reviewed.   Constitutional:       General: He is not in acute distress.     Appearance: He is well-developed.   HENT:      Head: Normocephalic and atraumatic.   Eyes:      Conjunctiva/sclera: Conjunctivae normal.   Cardiovascular:      Rate and Rhythm: Normal rate and regular rhythm.      Heart sounds: No murmur heard.  Pulmonary:      Effort: Pulmonary effort is normal. No respiratory distress.      Breath sounds: Normal breath sounds.   Abdominal:      Palpations: Abdomen is soft.      Tenderness: There is no abdominal tenderness.   Musculoskeletal:         General: Tenderness present. No swelling.      Right shoulder: Decreased range of motion.      Cervical back: Neck supple.   Skin:     General: Skin is warm and dry.      Capillary Refill: Capillary refill takes less than 2 seconds.   Neurological:      Mental Status: He is alert.   Psychiatric:         Mood and Affect: Mood normal.         "

## 2024-10-17 NOTE — ASSESSMENT & PLAN NOTE
BP at goal today 122/86. Lisinopril was increased to 40 mg at last visit 9/17/24 and appears to be working. Continue at current dose.     Orders:    lisinopril (ZESTRIL) 40 mg tablet; Take 1 tablet (40 mg total) by mouth daily

## 2024-10-17 NOTE — ASSESSMENT & PLAN NOTE
Seen by ortho 9/19/24 and prescribed medrol pack, IM Toradol which patient notes today was helpful. Pain is still present and gradually worsening.   He is scheduled for an MRI 10/24/24. Encouraged him to not miss this appointment as it is essential for work-up and to direct future treatment recommendations.   Continue management by orthopedics    Orders:    Diclofenac Sodium (VOLTAREN) 1 %; Apply 2 g topically 4 (four) times a day

## 2024-10-24 ENCOUNTER — HOSPITAL ENCOUNTER (OUTPATIENT)
Dept: MRI IMAGING | Facility: HOSPITAL | Age: 64
End: 2024-10-24
Payer: MEDICARE

## 2024-10-24 DIAGNOSIS — M75.81 TENDINITIS OF RIGHT ROTATOR CUFF: ICD-10-CM

## 2024-10-24 DIAGNOSIS — M25.511 CHRONIC RIGHT SHOULDER PAIN: ICD-10-CM

## 2024-10-24 DIAGNOSIS — G89.29 CHRONIC RIGHT SHOULDER PAIN: ICD-10-CM

## 2024-10-24 PROCEDURE — 73221 MRI JOINT UPR EXTREM W/O DYE: CPT

## 2024-10-30 ENCOUNTER — TELEPHONE (OUTPATIENT)
Age: 64
End: 2024-10-30

## 2024-10-30 NOTE — TELEPHONE ENCOUNTER
Caller: Clifford    Doctor: Guille    Reason for call: Calling to check status of MRI Results. Patient wanted to lwt you know he is in a great deal of pain.  Will a wait for 's return call.  Thank you    Call back#: [phone]

## 2024-11-04 ENCOUNTER — OFFICE VISIT (OUTPATIENT)
Dept: OBGYN CLINIC | Facility: MEDICAL CENTER | Age: 64
End: 2024-11-04
Payer: MEDICARE

## 2024-11-04 VITALS
DIASTOLIC BLOOD PRESSURE: 72 MMHG | HEART RATE: 76 BPM | BODY MASS INDEX: 24.83 KG/M2 | WEIGHT: 149 LBS | SYSTOLIC BLOOD PRESSURE: 118 MMHG | HEIGHT: 65 IN

## 2024-11-04 DIAGNOSIS — M75.81 TENDINITIS OF RIGHT ROTATOR CUFF: ICD-10-CM

## 2024-11-04 DIAGNOSIS — M25.511 CHRONIC RIGHT SHOULDER PAIN: Primary | ICD-10-CM

## 2024-11-04 DIAGNOSIS — G89.29 CHRONIC RIGHT SHOULDER PAIN: Primary | ICD-10-CM

## 2024-11-04 PROCEDURE — 99213 OFFICE O/P EST LOW 20 MIN: CPT | Performed by: EMERGENCY MEDICINE

## 2024-11-04 PROCEDURE — 20610 DRAIN/INJ JOINT/BURSA W/O US: CPT | Performed by: EMERGENCY MEDICINE

## 2024-11-04 RX ORDER — ROPIVACAINE HYDROCHLORIDE 2 MG/ML
4 INJECTION, SOLUTION EPIDURAL; INFILTRATION; PERINEURAL
Status: COMPLETED | OUTPATIENT
Start: 2024-11-04 | End: 2024-11-04

## 2024-11-04 RX ORDER — TRIAMCINOLONE ACETONIDE 40 MG/ML
40 INJECTION, SUSPENSION INTRA-ARTICULAR; INTRAMUSCULAR
Status: COMPLETED | OUTPATIENT
Start: 2024-11-04 | End: 2024-11-04

## 2024-11-04 RX ADMIN — TRIAMCINOLONE ACETONIDE 40 MG: 40 INJECTION, SUSPENSION INTRA-ARTICULAR; INTRAMUSCULAR at 16:15

## 2024-11-04 RX ADMIN — ROPIVACAINE HYDROCHLORIDE 4 ML: 2 INJECTION, SOLUTION EPIDURAL; INFILTRATION; PERINEURAL at 16:15

## 2024-11-04 NOTE — PATIENT INSTRUCTIONS
"Patient Education     Inyección de esteroides   Conceptos Básicos   Redactado por los médicos y editores de UpTo[employer]   ¿Qué es martha inyección de esteroides? -- Los esteroides, también conocidos ra \"glucocorticoides\", son medicinas que ayudan a reducir la inflamación y el dolor. A veces, los médicos inyectan un esteroide en martha articulación u otra parte del cuerpo para aliviar el dolor. A esto también se le llama a veces \"inyección de cortisona\".  Después de la inyección, el esteroide comienza a actuar a los pocos días.  ¿Cuánto tiempo dura martha inyección de esteroides? -- Depende de la persona y del lugar donde se aplica la inyección. Para algunas personas, los efectos de martha inyección de esteroides pueden durar algunas semanas o más.  A veces, el médico también inyecta martha medicina llamada \"anestésico local\" con el esteroide. [place] podría ayudar a aliviar el dolor hasta que el esteroide comience a actuar.  Martha inyección de esteroides puede ayudar con el dolor, don no curará el problema que lo está causando.  ¿Cómo me preparo para martha inyección de esteroides? -- El médico o enfermero le dirá si debe hacer algo especial para prepararse. Antes de read procedimiento, read médico le hará un examen.  Read médico también le preguntará sobre kesha \"antecedentes de yonas\". [place] implica hacerle preguntas sobre cualquier problema de yonas que tenga o haya tenido en el pasado, cirugías anteriores y cualquier medicina que tome. Cuéntele sobre:   Cualquier medicina que esté tomando - [place] incluye cualquier medicina recetada o \"de venta pao\" que use, además de cualquier suplemento a base de hierbas que tome. Es útil anotar y llevar martha lista de todas las medicinas que sedrick, o que lleve consigo martha bolsa con todas kesha medicinas.   Cualquier alergia que tenga   Cualquier problema de sangrado que tenga   Cualquier otra inyección de esteroides que haya recibido  Pregunte al médico o enfermero si tiene dudas o si hay algo que no " "entiende.  ¿Cómo se administra martha inyección de esteroides? -- Cuando llegue el momento de la inyección:   El médico limpiará la piel de la anali donde planea aplicar la inyección. ([place] se llama \"sitio de inyección\").   Es posible que utilice ultrasonido o un tipo especial de radiografía cindy el procedimiento. [place] es para comprobar dónde administrar el esteroide.   A veces, es posible que le aplique martha inyección de medicina para adormecer la piel.   Le inyectará el esteroide.   Luego, sacará la aguja y cubrirá el sitio de la inyección con martha venda.  ¿Qué sucede después de martha inyección de esteroides? -- Puede irse a casa después de la inyección.  Cindy los próximos días, es posible que desee:   Aplicar un paquete de gel frío, martha bolsa de hielo o martha bolsa de verduras congeladas en el sitio de la inyección cada hora a cada dos horas, cindy 15 minutos cada vez. Coloque martha toalla ambika entre el hielo (o el objeto frío) y read piel.   Descanse la parte del cuerpo tratada cindy unos días.   [place] medicinas para aliviar el dolor. Algunos ejemplos son la paracetamol (acetaminofén) (ejemplo de danielle comercial: Tylenol), el ibuprofeno (ejemplos de marcas comerciales: Advil, Motrin) o el naproxeno (ejemplo de danielle comercial: Aleve).  Si tiene diabetes, es posible que el médico quiera que revise kesha niveles de azúcar en kat con más frecuencia cindy algunos días. El esteroide podría aumentar temporalmente el nivel de azúcar en la kat.  ¿Cuáles son los riesgos de martha inyección de esteroides? -- Read médico le hablará sobre todos los riesgos posibles, y responderá a kesha preguntas. Los riesgos posibles son, entre otros:   Sangrado, moretones o dolor en el sitio de la inyección   Daño a las partes cercanas al sitio de la inyección - [place] puede incluir daño al cartílago, lesión de los nervios, rotura de tendones o adelgazamiento de la piel y los huesos.   La piel alrededor del sitio de la inyección se vuelve más " seda o más jewels   Infección   Padecimientos de yonas ra diabetes o presión arterial mars que empeoran cindy unos días   Reacción alérgica a la medicina  ¿Qué más marie saber? -- La mayoría de las veces, los médicos limitan la cantidad total de inyecciones de esteroides a martha anali determinada.  Martha inyección de esteroides podría ser solo martha parte de read plan de tratamiento. [place] las otras medicinas según las indicaciones. Además, siga las recomendaciones de read médico sobre otros tratamientos. Estos podrían incluir cosas ra fisioterapia o dispositivos ra un soporte o un bastón.  Todos los artículos se actualizan a medida que se descubre nueva evidencia y culmina nuestro proceso de evaluación por homólogos   Charmaine artículo se recuperó de UpToDate el: Apr 25, 2024.  Artículo 831937 Versión [id number].es-419.1  Release: 32.4.2 - C32.114  © 2024 UpToDate, Inc. Todos los derechos reservados.  Exención de responsabilidad y uso de la información del consumidor   Descargo de responsabilidad: esta información generalizada es un resumen limitado de información sobre el diagnóstico, el tratamiento y/o los medicamentos. No pretende ser exhaustiva y se debe utilizar ra herramienta para ayudar al usuario a comprender y/o evaluar las posibles opciones de diagnóstico y tratamiento. No incluye toda la información sobre afecciones, tratamientos, medicamentos, efectos secundarios o riesgos puedan ser aplicables a un paciente específico. No tiene el propósito de servir ra recomendación médica ni de sustituir la recomendación médica, el diagnóstico o el tratamiento de un profesional de atención médica que se base en el examen y la evaluación de charmaine profesional de la yonas respecto a las circunstancias específicas y únicas del paciente. Los pacientes deben hablar con un profesional de atención médica para obtener información completa sobre read yonas, cuestiones médicas y opciones de tratamiento, incluidos los riesgos o los beneficios  relacionados con el uso de medicamentos. Esta información no certifica que los tratamientos o medicamentos janet seguros, eficaces o estén aprobados para tratar a un paciente específico. [employer] y kesha afiliados renuncian a cualquier garantía o responsabilidad relacionada con esta información o el uso de la misma.El uso de esta información está sujeto a las Condiciones de uso, disponibles en https://www.[employer].com/en/know/clinical-effectiveness-terms. 2024© [employer] y kesha afiliados y/o licenciantes. Todos los derechos reservados.  Copyright   © 2024 [employer] Todos los derechos reservados.

## 2024-11-04 NOTE — PROGRESS NOTES
Assessment/Plan:    Diagnoses and all orders for this visit:    Chronic right shoulder pain  -     Large joint arthrocentesis: R subacromial bursa    Tendinitis of right rotator cuff  -     Large joint arthrocentesis: R subacromial bursa    Repeat Right SA CSI provided today as he did have improvement with prior CSI.  MRI wnl.    He has participated in formal physical therapy for the right shoulder    If no improvement t/c further evaluation for cervical radic  Had Medrol pack in May    Return in about 4 weeks (around 12/2/2024).      Subjective:   Patient ID: Clifford Blum is a 64 y.o. male.    Clifford returns having undergone MRI Right Shoulder.  He's experiencing right shoulder pain, but also radiates into elbow and forearm.  N/t forearm.      Previous note:  Patient returns having had significant improvement with SA CSI 98% improvement however his pain is returning.  It seems to be worse at night.  Limited ROM right shoulder.    Notes minimal symptoms Left shoulder  Taking Acetaminophen, has Diclofenac gel     Initial note:  NP presents for 8 months of Right shoulder pain.   Pt states that he is  and was carrying 75 lb boxes when pain began. Pt states that he has significant increase in pain and reduction in ROM.  Pain worse at night  He was prescribed MEDROL pack, provided Toradol IM, participating in PT          Review of Systems    The following portions of the patient's chart were reviewed and updated as appropriate:   Allergy:  No Known Allergies    Medications:    Current Outpatient Medications:     acetaminophen (TYLENOL) 650 mg CR tablet, Take 1 tablet (650 mg total) by mouth every 8 (eight) hours as needed for mild pain, Disp: 30 tablet, Rfl: 4    Diclofenac Sodium (VOLTAREN) 1 %, Apply 2 g topically 4 (four) times a day, Disp: 100 g, Rfl: 2    lisinopril (ZESTRIL) 40 mg tablet, Take 1 tablet (40 mg total) by mouth daily, Disp: 90 tablet, Rfl: 0    pantoprazole (PROTONIX) 40  "mg tablet, TAKE 1 TABLET BY MOUTH EVERY DAY, Disp: 30 tablet, Rfl: 2    rosuvastatin (CRESTOR) 10 MG tablet, Take 1 tablet (10 mg total) by mouth daily, Disp: 30 tablet, Rfl: 5    Current Facility-Administered Medications:     ropivacaine (NAROPIN) injection 3 mL, 3 mL, Intra-articular, Titrated, , 3 mL at 07/10/24 0900    Patient Active Problem List   Diagnosis    Essential hypertension    Mixed hyperlipidemia    Cervicalgia    Macrocytic anemia    Stage 2 chronic kidney disease    Chronic right shoulder pain    Gastroesophageal reflux disease    Elbow effusion, right    Platelets decreased (HCC)       Objective:  /72   Pulse 76   Ht 5' 5\" (1.651 m)   Wt 67.6 kg (149 lb)   BMI 24.79 kg/m²     Right Shoulder Exam     Range of Motion   Active abduction:  abnormal     Other   Erythema: absent            Physical Exam      Neurologic Exam    Large joint arthrocentesis: R subacromial bursa  Universal Protocol:  Consent: Verbal consent obtained.  Risks and benefits: risks, benefits and alternatives were discussed  Consent given by: patient  Time out: Immediately prior to procedure a \"time out\" was called to verify the correct patient, procedure, equipment, support staff and site/side marked as required.  Timeout called at: 11/4/2024 4:47 PM.  Patient understanding: patient states understanding of the procedure being performed  Test results: test results available and properly labeled  Site marked: the operative site was marked  Patient identity confirmed: verbally with patient  Supporting Documentation  Indications: pain   Procedure Details  Location: shoulder - R subacromial bursa  Preparation: Patient was prepped and draped in the usual sterile fashion  Needle size: 22 G  Ultrasound guidance: no  Approach: posterolateral  Medications administered: 40 mg triamcinolone acetonide 40 mg/mL; 4 mL ropivacaine 0.2 %    Patient tolerance: patient tolerated the procedure well with no immediate complications  Dressing: "  Sterile dressing applied    No erythema of Shoulder(s)        I have personally reviewed the written report of the pertinent studies.     MRI Right Shoulder  IMPRESSION:     1. Mild supraspinatus and infraspinatus tendinosis without tear.     2. Mild glenohumeral and acromioclavicular osteoarthritis.            Past Medical History:   Diagnosis Date    Anemia     Chronic kidney disease     Colon polyp     Hyperlipidemia     Hypertension     Suprapubic tenderness 06/22/2023       History reviewed. No pertinent surgical history.    Social History     Socioeconomic History    Marital status: /Civil Union     Spouse name: Not on file    Number of children: Not on file    Years of education: Not on file    Highest education level: Not on file   Occupational History    Not on file   Tobacco Use    Smoking status: Never     Passive exposure: Never    Smokeless tobacco: Never   Vaping Use    Vaping status: Never Used   Substance and Sexual Activity    Alcohol use: Yes     Comment: Social    Drug use: Never    Sexual activity: Not on file   Other Topics Concern    Not on file   Social History Narrative    Not on file     Social Determinants of Health     Financial Resource Strain: Medium Risk (5/29/2024)    Overall Financial Resource Strain (CARDIA)     Difficulty of Paying Living Expenses: Somewhat hard   Food Insecurity: Food Insecurity Present (5/29/2024)    Nursing - Inadequate Food Risk Classification     Worried About Running Out of Food in the Last Year: Often true     Ran Out of Food in the Last Year: Often true     Ran Out of Food in the Last Year: Not on file   Transportation Needs: Unmet Transportation Needs (5/29/2024)    PRAPARE - Transportation     Lack of Transportation (Medical): Yes     Lack of Transportation (Non-Medical): Yes   Physical Activity: Not on file   Stress: Not on file   Social Connections: Not on file   Intimate Partner Violence: Not on file   Housing Stability: Low Risk  (5/29/2024)     Housing Stability Vital Sign     Unable to Pay for Housing in the Last Year: No     Number of Times Moved in the Last Year: 1     Homeless in the Last Year: No       History reviewed. No pertinent family history.

## 2024-11-09 DIAGNOSIS — K21.9 GASTROESOPHAGEAL REFLUX DISEASE, UNSPECIFIED WHETHER ESOPHAGITIS PRESENT: ICD-10-CM

## 2024-11-11 RX ORDER — PANTOPRAZOLE SODIUM 40 MG/1
40 TABLET, DELAYED RELEASE ORAL DAILY
Qty: 30 TABLET | Refills: 2 | Status: SHIPPED | OUTPATIENT
Start: 2024-11-11

## 2025-01-08 ENCOUNTER — TELEPHONE (OUTPATIENT)
Dept: FAMILY MEDICINE CLINIC | Facility: CLINIC | Age: 65
End: 2025-01-08

## 2025-01-22 ENCOUNTER — OFFICE VISIT (OUTPATIENT)
Dept: FAMILY MEDICINE CLINIC | Facility: CLINIC | Age: 65
End: 2025-01-22

## 2025-01-22 VITALS
DIASTOLIC BLOOD PRESSURE: 80 MMHG | BODY MASS INDEX: 26.02 KG/M2 | OXYGEN SATURATION: 96 % | WEIGHT: 156.2 LBS | HEART RATE: 93 BPM | SYSTOLIC BLOOD PRESSURE: 130 MMHG | RESPIRATION RATE: 14 BRPM | HEIGHT: 65 IN | TEMPERATURE: 97.8 F

## 2025-01-22 DIAGNOSIS — G89.29 CHRONIC RIGHT SHOULDER PAIN: ICD-10-CM

## 2025-01-22 DIAGNOSIS — E78.2 MIXED HYPERLIPIDEMIA: ICD-10-CM

## 2025-01-22 DIAGNOSIS — K21.9 GASTROESOPHAGEAL REFLUX DISEASE, UNSPECIFIED WHETHER ESOPHAGITIS PRESENT: ICD-10-CM

## 2025-01-22 DIAGNOSIS — Z11.59 NEED FOR HEPATITIS C SCREENING TEST: ICD-10-CM

## 2025-01-22 DIAGNOSIS — M25.511 CHRONIC RIGHT SHOULDER PAIN: ICD-10-CM

## 2025-01-22 DIAGNOSIS — D53.9 MACROCYTIC ANEMIA: ICD-10-CM

## 2025-01-22 DIAGNOSIS — Z13.1 DIABETES MELLITUS SCREENING: ICD-10-CM

## 2025-01-22 DIAGNOSIS — D69.6 PLATELETS DECREASED (HCC): ICD-10-CM

## 2025-01-22 DIAGNOSIS — Z00.00 ANNUAL PHYSICAL EXAM: Primary | ICD-10-CM

## 2025-01-22 DIAGNOSIS — Z11.4 SCREENING FOR HIV (HUMAN IMMUNODEFICIENCY VIRUS): ICD-10-CM

## 2025-01-22 DIAGNOSIS — I10 ESSENTIAL HYPERTENSION: ICD-10-CM

## 2025-01-22 RX ORDER — SENNOSIDES 8.6 MG
650 CAPSULE ORAL EVERY 8 HOURS PRN
Qty: 30 TABLET | Refills: 4 | Status: SHIPPED | OUTPATIENT
Start: 2025-01-22

## 2025-01-22 RX ORDER — LISINOPRIL 40 MG/1
40 TABLET ORAL DAILY
Qty: 90 TABLET | Refills: 0 | Status: SHIPPED | OUTPATIENT
Start: 2025-01-22

## 2025-01-22 RX ORDER — PANTOPRAZOLE SODIUM 40 MG/1
40 TABLET, DELAYED RELEASE ORAL DAILY
Qty: 90 TABLET | Refills: 1 | Status: SHIPPED | OUTPATIENT
Start: 2025-01-22

## 2025-01-22 RX ORDER — ROSUVASTATIN CALCIUM 10 MG/1
10 TABLET, COATED ORAL DAILY
Qty: 90 TABLET | Refills: 1 | Status: SHIPPED | OUTPATIENT
Start: 2025-01-22

## 2025-01-22 NOTE — ASSESSMENT & PLAN NOTE
Will recheck CBC.    Lab Results   Component Value Date    WBC 6.84 04/15/2024    HGB 15.4 04/15/2024    HCT 47.6 04/15/2024    MCV 99 (H) 04/15/2024     (L) 04/15/2024       Orders:    CBC and differential; Future

## 2025-01-22 NOTE — PROGRESS NOTES
Adult Annual Physical  Name: Clifford Blum      : 1960      MRN: [id number]  Encounter Provider: LYDIA Miller  Encounter Date: 2025   Encounter department: [facility]    Assessment & Plan  Annual physical exam  Immunizations and preventive care screenings were discussed with patient today. Appropriate education was printed on patient's after visit summary.  He had overdue labs from his previous visit.  He reports he is now staying in [place] and so the lab work updated to the be able to be drawn without regard to fasting status so he can complete today.  Patient declined flu, COVID, pneumonia, and shingles vaccination.  He declined PSA screening.  Colorectal cancer screening not due until        Chronic right shoulder pain  Patient following orthopedics for chronic right shoulder pain.  He recently received a steroid injection which she reports was helpful.  He is requesting refills of the acetaminophen and diclofenac to take with him on a visit to the DR.  Orders:    acetaminophen (TYLENOL) 650 mg CR tablet; Take 1 tablet (650 mg total) by mouth every 8 (eight) hours as needed for mild pain    Diclofenac Sodium (VOLTAREN) 1 %; Apply 2 g topically 4 (four) times a day    Essential hypertension  Blood pressure at goal on today's visit.  Continue current regimen of lisinopril 40 mg daily.  BP Readings from Last 3 Encounters:   25 130/80   24 118/72   10/17/24 122/86       Orders:    lisinopril (ZESTRIL) 40 mg tablet; Take 1 tablet (40 mg total) by mouth daily    Comprehensive metabolic panel; Future    Gastroesophageal reflux disease, unspecified whether esophagitis present  Stable on pantoprazole 40 mg daily.  Discussed diet and lifestyle interventions to improve sx including: avoidance of common triggers (chocolate, caffeine, tomatoes, citrus), eat small meals frequently, remain upright after meals.    Orders:    pantoprazole (PROTONIX) 40 mg  tablet; Take 1 tablet (40 mg total) by mouth daily    Mixed hyperlipidemia  Continue current regimen of rosuvastatin 10 mg daily.  Will repeat lipid panel and adjust accordingly.  Lab Results   Component Value Date    CHOLESTEROL 226 (H) 04/15/2024    CHOLESTEROL 171 10/16/2018     Lab Results   Component Value Date    HDL 69 04/15/2024    HDL 44 10/16/2018     Lab Results   Component Value Date    TRIG 130 04/15/2024    TRIG 124 10/16/2018     Lab Results   Component Value Date    NONHDLC 127 10/16/2018       Orders:    rosuvastatin (CRESTOR) 10 MG tablet; Take 1 tablet (10 mg total) by mouth daily    Lipid panel; Future    Platelets decreased (HCC)  Will recheck CBC.    Lab Results   Component Value Date    WBC 6.84 04/15/2024    HGB 15.4 04/15/2024    HCT 47.6 04/15/2024    MCV 99 (H) 04/15/2024     (L) 04/15/2024       Orders:    CBC and differential; Future    Screening for HIV (human immunodeficiency virus)    Orders:    HIV 1/2 AG/AB w Reflex [facility] for 2 yr old and above; Future    Macrocytic anemia  Will recheck CBC and B12 level.  Last CBC with an increase in MCV but no changes to hemoglobin or hematocrit.   Orders:    Vitamin B12; Future    Need for hepatitis C screening test    Orders:    Hepatitis C antibody; Future    Diabetes mellitus screening    Orders:    Hemoglobin A1C; Future          Counseling:  Alcohol/drug use: discussed moderation in alcohol intake, the recommendations for healthy alcohol use, and avoidance of illicit drug use.  Dental Health: discussed importance of regular tooth brushing, flossing, and dental visits.  Injury prevention: discussed safety/seat belts, safety helmets, smoke detectors, carbon monoxide detectors, and smoking near bedding or upholstery.  Sexual health: discussed sexually transmitted diseases, partner selection, use of condoms, avoidance of unintended pregnancy, and contraceptive alternatives.  Exercise: the importance of regular exercise/physical activity  was discussed. Recommend exercise 3-5 times per week for at least 30 minutes.     BMI Counseling: Body mass index is 25.99 kg/m². The BMI is above normal. Nutrition recommendations include decreasing portion sizes, encouraging healthy choices of fruits and vegetables, decreasing fast food intake, consuming healthier snacks, limiting drinks that contain sugar, moderation in carbohydrate intake, increasing intake of lean protein, reducing intake of saturated and trans fat and reducing intake of cholesterol. Exercise recommendations include moderate physical activity 150 minutes/week, exercising 3-5 times per week and strength training exercises. Rationale for BMI follow-up plan is due to patient being overweight or obese.     Depression Screening and Follow-up Plan: Patient was screened for depression during today's encounter. They screened negative with a PHQ-2 score of 0.        History of Present Illness     Adult Annual Physical:  Patient presents for annual physical. Clifford Blum is a 65 year old male with a history of HTN, GERD, CKD 2, macrocytic anemia, thrombocytopenia, cervicalgia, chronic right shoulder pain, HLD. He presents to the office today for a routine physical exam.    At today's visit, he has no acute complaints. He requests 90-day refills of his medications in anticipation of a visit to the DR pizarro. .     Diet and Physical Activity:  - Diet/Nutrition: well balanced diet.  - Exercise: no formal exercise.    Depression Screening:  - PHQ-2 Score: 0    General Health:  - Sleep: sleeps well.  - Hearing: normal hearing right ear and normal hearing left ear.  - Vision: no vision problems.  - Dental: no dental visits for > 1 year and brushes teeth twice daily.     Health:  - History of STDs: no.   - Urinary symptoms: none.     Advanced Care Planning:  - Has an advanced directive?: no    - Has a durable medical POA?: no    - ACP document given to patient?: no      Review of Systems   Constitutional:   Negative for chills and fever.   HENT:  Negative for ear pain and sore throat.    Eyes:  Negative for pain and visual disturbance.   Respiratory:  Negative for cough and shortness of breath.    Cardiovascular:  Negative for chest pain and palpitations.   Gastrointestinal:  Negative for abdominal pain and vomiting.   Genitourinary:  Negative for dysuria and hematuria.   Musculoskeletal:  Negative for arthralgias and back pain.   Skin:  Negative for color change and rash.   Neurological:  Negative for seizures and syncope.   All other systems reviewed and are negative.    Pertinent Medical History   Patient Active Problem List   Diagnosis    Essential hypertension    Mixed hyperlipidemia    Cervicalgia    Macrocytic anemia    Stage 2 chronic kidney disease    Chronic right shoulder pain    Gastroesophageal reflux disease    Elbow effusion, right    Platelets decreased (HCC)         Medical History Reviewed by provider this encounter:     .  Current Outpatient Medications on File Prior to Visit   Medication Sig Dispense Refill    [DISCONTINUED] acetaminophen (TYLENOL) 650 mg CR tablet Take 1 tablet (650 mg total) by mouth every 8 (eight) hours as needed for mild pain 30 tablet 4    [DISCONTINUED] Diclofenac Sodium (VOLTAREN) 1 % Apply 2 g topically 4 (four) times a day 100 g 2    [DISCONTINUED] lisinopril (ZESTRIL) 40 mg tablet Take 1 tablet (40 mg total) by mouth daily 90 tablet 0    [DISCONTINUED] pantoprazole (PROTONIX) 40 mg tablet TAKE 1 TABLET BY MOUTH EVERY DAY 30 tablet 2    [DISCONTINUED] rosuvastatin (CRESTOR) 10 MG tablet Take 1 tablet (10 mg total) by mouth daily 30 tablet 5     Current Facility-Administered Medications on File Prior to Visit   Medication Dose Route Frequency Provider Last Rate Last Admin    ropivacaine (NAROPIN) injection 3 mL  3 mL Intra-articular Titrated    3 mL at 07/10/24 0900      Social History     Tobacco Use    Smoking status: Never     Passive exposure: Never    Smokeless  "tobacco: Never   Vaping Use    Vaping status: Never Used   Substance and Sexual Activity    Alcohol use: Yes     Comment: Social    Drug use: Never    Sexual activity: Not on file       Objective   /80 (BP Location: Right arm, Patient Position: Sitting, Cuff Size: Standard)   Pulse 93   Temp 97.8 °F (36.6 °C) (Temporal)   Resp 14   Ht 5' 5\" (1.651 m)   Wt 70.9 kg (156 lb 3.2 oz)   SpO2 96%   BMI 25.99 kg/m²     Physical Exam  Vitals and nursing note reviewed.   Constitutional:       General: He is not in acute distress.     Appearance: He is well-developed.   HENT:      Head: Normocephalic and atraumatic.      Right Ear: Tympanic membrane, ear canal and external ear normal.      Left Ear: Tympanic membrane, ear canal and external ear normal.      Nose: Nose normal. No congestion.      Mouth/Throat:      Mouth: Mucous membranes are moist.      Pharynx: Oropharynx is clear. No oropharyngeal exudate or posterior oropharyngeal erythema.   Eyes:      Conjunctiva/sclera: Conjunctivae normal.      Pupils: Pupils are equal, round, and reactive to light.   Cardiovascular:      Rate and Rhythm: Normal rate and regular rhythm.      Heart sounds: No murmur heard.  Pulmonary:      Effort: Pulmonary effort is normal. No respiratory distress.      Breath sounds: Normal breath sounds.   Abdominal:      General: Bowel sounds are normal.      Palpations: Abdomen is soft.      Tenderness: There is no abdominal tenderness.   Musculoskeletal:         General: No swelling.      Cervical back: Neck supple.   Lymphadenopathy:      Cervical: No cervical adenopathy.   Skin:     General: Skin is warm and dry.      Capillary Refill: Capillary refill takes less than 2 seconds.      Findings: No rash.   Neurological:      Mental Status: He is alert.   Psychiatric:         Mood and Affect: Mood normal.       "

## 2025-01-22 NOTE — ASSESSMENT & PLAN NOTE
Continue current regimen of rosuvastatin 10 mg daily.  Will repeat lipid panel and adjust accordingly.  Lab Results   Component Value Date    CHOLESTEROL 226 (H) 04/15/2024    CHOLESTEROL 171 10/16/2018     Lab Results   Component Value Date    HDL 69 04/15/2024    HDL 44 10/16/2018     Lab Results   Component Value Date    TRIG 130 04/15/2024    TRIG 124 10/16/2018     Lab Results   Component Value Date    NONHDLC 127 10/16/2018       Orders:    rosuvastatin (CRESTOR) 10 MG tablet; Take 1 tablet (10 mg total) by mouth daily    Lipid panel; Future

## 2025-01-22 NOTE — ASSESSMENT & PLAN NOTE
Patient following orthopedics for chronic right shoulder pain.  He recently received a steroid injection which she reports was helpful.  He is requesting refills of the acetaminophen and diclofenac to take with him on a visit to the DR.  Orders:    acetaminophen (TYLENOL) 650 mg CR tablet; Take 1 tablet (650 mg total) by mouth every 8 (eight) hours as needed for mild pain    Diclofenac Sodium (VOLTAREN) 1 %; Apply 2 g topically 4 (four) times a day

## 2025-01-22 NOTE — ASSESSMENT & PLAN NOTE
Stable on pantoprazole 40 mg daily.  Discussed diet and lifestyle interventions to improve sx including: avoidance of common triggers (chocolate, caffeine, tomatoes, citrus), eat small meals frequently, remain upright after meals.    Orders:    pantoprazole (PROTONIX) 40 mg tablet; Take 1 tablet (40 mg total) by mouth daily

## 2025-01-22 NOTE — ASSESSMENT & PLAN NOTE
Will recheck CBC and B12 level.  Last CBC with an increase in MCV but no changes to hemoglobin or hematocrit.   Orders:    Vitamin B12; Future

## 2025-01-22 NOTE — PATIENT INSTRUCTIONS
"Patient Education     Routine physical for adults   The Basics   Written by the doctors and editors at [facility]   What is a physical? -- A physical is a routine visit, or \"check-up,\" with your doctor. You might also hear it called a \"wellness visit\" or \"preventive visit.\"  During each visit, the doctor will:   Ask about your physical and mental health   Ask about your habits, behaviors, and lifestyle   Do an exam   Give you vaccines if needed   Talk to you about any medicines you take   Give advice about your health   Answer your questions  Getting regular check-ups is an important part of taking care of your health. It can help your doctor find and treat any problems you have. But it's also important for preventing health problems.  A routine physical is different from a \"sick visit.\" A sick visit is when you see a doctor because of a health concern or problem. Since physicals are scheduled ahead of time, you can think about what you want to ask the doctor.  How often should I get a physical? -- It depends on your age and health. In general, for people age 21 years and older:   If you are younger than 50 years, you might be able to get a physical every 3 years.   If you are 50 years or older, your doctor might recommend a physical every year.  If you have an ongoing health condition, like diabetes or high blood pressure, your doctor will probably want to see you more often.  What happens during a physical? -- In general, each visit will include:   Physical exam - The doctor or nurse will check your height, weight, heart rate, and blood pressure. They will also look at your eyes and ears. They will ask about how you are feeling and whether you have any symptoms that bother you.   Medicines - It's a good idea to bring a list of all the medicines you take to each doctor visit. Your doctor will talk to you about your medicines and answer any questions. Tell them if you are having any side effects that bother you. You " "should also tell them if you are having trouble paying for any of your medicines.   Habits and behaviors - This includes:   Your diet   Your exercise habits   Whether you smoke, drink alcohol, or use drugs   Whether you are sexually active   Whether you feel safe at home  Your doctor will talk to you about things you can do to improve your health and lower your risk of health problems. They will also offer help and support. For example, if you want to quit smoking, they can give you advice and might prescribe medicines. If you want to improve your diet or get more physical activity, they can help you with this, too.   Lab tests, if needed - The tests you get will depend on your age and situation. For example, your doctor might want to check your:   Cholesterol   Blood sugar   Iron level   Vaccines - The recommended vaccines will depend on your age, health, and what vaccines you already had. Vaccines are very important because they can prevent certain serious or deadly infections.   Discussion of screening - \"Screening\" means checking for diseases or other health problems before they cause symptoms. Your doctor can recommend screening based on your age, risk, and preferences. This might include tests to check for:   Cancer, such as breast, prostate, cervical, ovarian, colorectal, prostate, lung, or skin cancer   Sexually transmitted infections, such as chlamydia and gonorrhea   Mental health conditions like depression and anxiety  Your doctor will talk to you about the different types of screening tests. They can help you decide which screenings to have. They can also explain what the results might mean.   Answering questions - The physical is a good time to ask the doctor or nurse questions about your health. If needed, they can refer you to other doctors or specialists, too.  Adults older than 65 years often need other care, too. As you get older, your doctor will talk to you about:   How to prevent falling at " home   Hearing or vision tests   Memory testing   How to take your medicines safely   Making sure that you have the help and support you need at home  All topics are updated as new evidence becomes available and our peer review process is complete.  This topic retrieved from youwho on: May 02, 2024.  Topic 551714 Version 1.0  Release: 32.4.3 - C32.122  © 2024 UpToDate, Inc. and/or its affiliates. All rights reserved.  Consumer Information Use and Disclaimer   Disclaimer: This generalized information is a limited summary of diagnosis, treatment, and/or medication information. It is not meant to be comprehensive and should be used as a tool to help the user understand and/or assess potential diagnostic and treatment options. It does NOT include all information about conditions, treatments, medications, side effects, or risks that may apply to a specific patient. It is not intended to be medical advice or a substitute for the medical advice, diagnosis, or treatment of a health care provider based on the health care provider's examination and assessment of a patient's specific and unique circumstances. Patients must speak with a health care provider for complete information about their health, medical questions, and treatment options, including any risks or benefits regarding use of medications. This information does not endorse any treatments or medications as safe, effective, or approved for treating a specific patient. UpToDate, Inc. and its affiliates disclaim any warranty or liability relating to this information or the use thereof.The use of this information is governed by the Terms of Use, available at https://www.[employer].com/en/know/clinical-effectiveness-terms. 2024© UpToDate, Inc. and its affiliates and/or licensors. All rights reserved.  Copyright   © 2024 UpToDate, Inc. and/or its affiliates. All rights reserved.

## 2025-01-22 NOTE — ASSESSMENT & PLAN NOTE
Blood pressure at goal on today's visit.  Continue current regimen of lisinopril 40 mg daily.  BP Readings from Last 3 Encounters:   01/22/25 130/80   11/04/24 118/72   10/17/24 122/86       Orders:    lisinopril (ZESTRIL) 40 mg tablet; Take 1 tablet (40 mg total) by mouth daily    Comprehensive metabolic panel; Future

## 2025-02-25 ENCOUNTER — OFFICE VISIT (OUTPATIENT)
Dept: FAMILY MEDICINE CLINIC | Facility: CLINIC | Age: 65
End: 2025-02-25

## 2025-02-25 VITALS
HEIGHT: 65 IN | DIASTOLIC BLOOD PRESSURE: 82 MMHG | SYSTOLIC BLOOD PRESSURE: 146 MMHG | RESPIRATION RATE: 18 BRPM | HEART RATE: 72 BPM | TEMPERATURE: 97.7 F | BODY MASS INDEX: 25.66 KG/M2 | OXYGEN SATURATION: 99 % | WEIGHT: 154 LBS

## 2025-02-25 DIAGNOSIS — M25.511 CHRONIC RIGHT SHOULDER PAIN: Primary | ICD-10-CM

## 2025-02-25 DIAGNOSIS — G89.29 CHRONIC RIGHT SHOULDER PAIN: Primary | ICD-10-CM

## 2025-02-25 PROCEDURE — 99213 OFFICE O/P EST LOW 20 MIN: CPT

## 2025-02-25 RX ORDER — SENNOSIDES 8.6 MG
650 CAPSULE ORAL EVERY 8 HOURS PRN
Qty: 30 TABLET | Refills: 4 | Status: SHIPPED | OUTPATIENT
Start: 2025-02-25

## 2025-02-25 NOTE — PROGRESS NOTES
"Name: Clifford Blum      : 1960      MRN: [id number]  Encounter Provider: LYDIA Silva  Encounter Date: 2025   Encounter department: [facility]  :  Assessment & Plan  Chronic right shoulder pain  Recommend going back to orthopedic & starting PT.  Medications as prescribed. Advised patient that I am unable to provide this letter. Recommend f/u SW to help with disability application process.   Orders:    acetaminophen (TYLENOL) 650 mg CR tablet; Take 1 tablet (650 mg total) by mouth every 8 (eight) hours as needed for mild pain    Diclofenac Sodium (VOLTAREN) 1 %; Apply 2 g topically 4 (four) times a day    Ambulatory Referral to Physical Therapy; Future    Ambulatory Referral to Social Work Care Management Program; Future           History of Present Illness   Clifford Blum is a 65 y.o. male  has a past medical history of Anemia, Chronic kidney disease, Colon polyp, Hyperlipidemia, Hypertension, and Suprapubic tenderness.  has no past surgical history on file.    Patient is a 65 year old male with past medical history of chronic right shoulder pain, anemia,CKD,  colon polyp, HL, and HTN  presenting with complaints of acute exacerbation of his chronic right shoulder pain. Reports pain of 10/10, he is unable to sleep, he's unable to  even a gallon of milk Patient's shoulder was injected by orthopedic approximately  4 months ago, he experienced relief for two months. Reports diclofenac cream and acetaminophen give adequate relief. He needs a refill. Patient also inquiring about receiving a letter stating that he is unable to work due to his shoulder pain. He does not work and would like to file for disability. He declines a shoulder injection.      Review of Systems  As per HPI    Objective   /82 (BP Location: Left arm, Patient Position: Sitting, Cuff Size: Standard)   Pulse 72   Temp 97.7 °F (36.5 °C) (Temporal)   Resp 18   Ht 5' 5\" " (1.651 m)   Wt 69.9 kg (154 lb)   SpO2 99%   BMI 25.63 kg/m²      Physical Exam  Vitals and nursing note reviewed.   Constitutional:       General: He is not in acute distress.     Appearance: He is well-developed and overweight.   HENT:      Head: Normocephalic and atraumatic.      Right Ear: External ear normal.      Left Ear: External ear normal.      Nose: Nose normal.   Eyes:      Conjunctiva/sclera: Conjunctivae normal.   Cardiovascular:      Rate and Rhythm: Normal rate and regular rhythm.      Pulses: Normal pulses.      Heart sounds: Normal heart sounds. No murmur heard.  Pulmonary:      Effort: Pulmonary effort is normal. No respiratory distress.      Breath sounds: Normal breath sounds.   Abdominal:      Palpations: Abdomen is soft.      Tenderness: There is no abdominal tenderness.   Musculoskeletal:         General: No swelling. Normal range of motion.      Right shoulder: Tenderness present. No swelling, deformity, effusion, laceration, bony tenderness or crepitus. Normal range of motion. Normal strength. Normal pulse.      Left shoulder: Normal.      Cervical back: Normal range of motion and neck supple.      Comments: Right shoulder: Neer, empty can and apprehension test are positive for pain.     Skin:     General: Skin is warm and dry.      Capillary Refill: Capillary refill takes less than 2 seconds.   Neurological:      Mental Status: He is alert and oriented to person, place, and time. Mental status is at baseline.   Psychiatric:         Mood and Affect: Mood normal.         Behavior: Behavior normal.         Thought Content: Thought content normal.         Judgment: Judgment normal.

## 2025-02-25 NOTE — ASSESSMENT & PLAN NOTE
Recommend going back to orthopedic & starting PT.  Medications as prescribed. Advised patient that I am unable to provide this letter. Recommend f/u SW to help with disability application process.   Orders:    acetaminophen (TYLENOL) 650 mg CR tablet; Take 1 tablet (650 mg total) by mouth every 8 (eight) hours as needed for mild pain    Diclofenac Sodium (VOLTAREN) 1 %; Apply 2 g topically 4 (four) times a day    Ambulatory Referral to Physical Therapy; Future    Ambulatory Referral to Social Work Care Management Program; Future

## 2025-03-03 ENCOUNTER — EVALUATION (OUTPATIENT)
Dept: PHYSICAL THERAPY | Facility: CLINIC | Age: 65
End: 2025-03-03
Payer: MEDICARE

## 2025-03-03 DIAGNOSIS — M25.511 CHRONIC RIGHT SHOULDER PAIN: ICD-10-CM

## 2025-03-03 DIAGNOSIS — G89.29 CHRONIC RIGHT SHOULDER PAIN: ICD-10-CM

## 2025-03-03 PROCEDURE — 97163 PT EVAL HIGH COMPLEX 45 MIN: CPT | Performed by: PHYSICAL THERAPIST

## 2025-03-03 PROCEDURE — 97140 MANUAL THERAPY 1/> REGIONS: CPT | Performed by: PHYSICAL THERAPIST

## 2025-03-03 NOTE — PROGRESS NOTES
PT Evaluation     Today's date: 3/3/2025  Patient name: Clifford Blum  : 1960  MRN: [id number]  Referring provider: Rebeka Lei, *  Dx:   Encounter Diagnosis     ICD-10-CM    1. Chronic right shoulder pain  M25.511 Ambulatory Referral to Physical Therapy    G89.29 PT plan of care cert/re-cert          Start Time: 1130  Stop Time: 1232  Total time in clinic (min): 62 minutes    Assessment  Impairments: abnormal coordination, abnormal muscle firing, abnormal muscle tone, abnormal or restricted ROM, abnormal movement, impaired physical strength, lacks appropriate home exercise program, pain with function, poor posture , unable to perform ADL, participation limitations, activity limitations and endurance  Symptom irritability: high    Assessment details: Pt is a 65 y.o. year old male presenting to physical therapy for chronic right shoulder pain. He present with the following impairments: limited shoulder ROM R>L, pain with movement, UT hypertonicity, decreased  strength, R UE weakness, limited cervical ROM, hypomobile cervical spine and scapular mobility, TTP: R UT, deltoid, bicep tendon and groove, RC insertion, R rhomboids, (+) R median nerve ULTT, spurlings b/l, R Hawkin's Malachi, R painful arc, R speeds test, R full/empty can affecting their function with reaching overhead, carrying items, sleeping, and working.  Evaluation findings and imaging indicate potential RC tendonopathy, R GH OA, and R cervical radiculopathy. Pt will benefit from skilled physical therapy to address functional limitations noted in evaluation and meet patient goals.           Understanding of Dx/Px/POC: good     Prognosis: good    Goals  STG  1.Patient will demonstrate independence with HEP  2. Patient will improve R shoulder flexion AROM to 120 deg to improve ability to reach overhead  3. Patient will improve pain levels to 4/10 at worst  LTG  1.Patient will meet expected FOTO score  2.Patient will be able to carry  "a 25# box without increase in pain to improve ability to complete ADLs and work responsiblities  3. Patient will be able to drive for 30 minutes without shoulder pain    Plan  Patient would benefit from: PT eval and skilled physical therapy  Planned modality interventions: TENS, cryotherapy and thermotherapy: hydrocollator packs    Planned therapy interventions: joint mobilization, postural training, patient/caregiver education, neuromuscular re-education, nerve gliding, massage, manual therapy, stretching, therapeutic activities, therapeutic exercise, functional ROM exercises, flexibility, coordination, strengthening and home exercise program    Frequency: 2x week  Duration in weeks: 8      Subjective Evaluation    History of Present Illness  Mechanism of injury: Patient reports insidious onset R shoulder pain for months. Patient reports pain \"feels like something is walking inside\" of his R arm because the pain travels between shoulder to forearm. Patient reports R neck pain and intermittent tingling in R arm. He reports gross R arm weakness and difficulty extending fingers. Patient also reports dropping items. Patient reports taking tylenol and voltaren with some relief. 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 Recommended discussing applying for disability with physician. Patient reports difficulty sleeping due to pain. Patient denies trauma to shoulder, denies headaches.  Pain  Current pain ratin  At worst pain rating: 10  Quality: sharp  Relieving factors: medications  Aggravating factors: lifting and overhead activity        Objective     Postural Observations  Seated posture: poor  Standing posture: poor    Additional Postural Observation Details  Forward head and rounded shoulders    Observations     Right Shoulder  Positive for spasms.     Palpation   Left   No palpable tenderness to the anterior deltoid, biceps, " infraspinatus, levator scapulae, middle deltoid, middle trapezius, posterior deltoid, rhomboids, supraspinatus, teres major, teres minor and upper trapezius.     Right   Hypertonic in the levator scapulae, rhomboids and upper trapezius.   Tenderness of the anterior deltoid, biceps, infraspinatus, levator scapulae, middle deltoid, middle trapezius, posterior deltoid, rhomboids and upper trapezius.     Tenderness     Right Shoulder  Tenderness in the biceps tendon (proximal), bicipital groove, infraspinatus tendon and supraspinatus tendon.     Additional Tenderness Details  Patient experienced most tenderness at infraspinatus and supraspinatus insertion on humerus    Active Range of Motion   Left Shoulder   Flexion: 125 degrees   Abduction: 120 degrees     Right Shoulder   Flexion: 70 degrees with pain  Abduction: 80 degrees with pain    Passive Range of Motion     Additional Passive Range of Motion Details  Muscle guarding and high irritability limit PROM assessment    Scapular Mobility     Right Shoulder   Scapular mobility: poor    Joint Play   Left Shoulder  Joints within functional limits are the cervical spine.     Right Shoulder  Joints within functional limits are the cervical spine.     Hypomobile: C1, C2, C3, C4, C5, C6, C7 and T1     Strength/Myotome Testing     Left Shoulder     Planes of Motion   Flexion: 4   Abduction: 4   External rotation at 0°: 4   Internal rotation at 0°: 4     Right Shoulder     Planes of Motion   Flexion: 2+   Abduction: 2+   External rotation at 0°: 3+   Internal rotation at 0°: 3+     Additional Strength Details   strength:  L: 33, 30, 30lb  R: 10, 12, 10lb    Tests   Cervical     Left   Positive Spurling's Test A.     Right   Positive Spurling's Test A.     Left Shoulder   Positive ULTT1.   Negative apprehension, drop arm, empty can, external rotation lag sign, Hawkin's, painful arc and Speed's.     Right Shoulder   Positive empty can, external rotation lag sign, Hawkin's,  "painful arc, Speed's and ULTT1.   Negative apprehension and drop arm.       Flowsheet Rows      Flowsheet Row Most Recent Value   PT/OT G-Codes    Current Score 36   Projected Score 59               Precautions: Albanian speaking, CKD      Date 3/3            Visit # IE            FOTO IE             Re-eval IE                 Manuals 3/3            GH PROM ND            Scapular PROM ND R            Cervical PROM ND            UT/LS str and STM ND            Neuro Re-Ed 3/3            Median nerve glides 10x R            No moneys 10x PTB            Rows             AAROM flex/abd/ER nv            Resisted ER/IR             Chin tucks 10x5\"            Scap retract 10x5\"            Prone T's/I's/Y's             Wall slides             Ther Ex 3/3            Pulleys nv            UBE             Tables slides nv            UT/Ls str nv            Pec str nv                                                   Ther Activity 3/3                                      Gait Training 3/3                                      Modalities 3/3            MHP             Cold pack                  "

## 2025-03-04 ENCOUNTER — APPOINTMENT (OUTPATIENT)
Dept: RADIOLOGY | Facility: MEDICAL CENTER | Age: 65
End: 2025-03-04
Payer: MEDICARE

## 2025-03-04 ENCOUNTER — PATIENT OUTREACH (OUTPATIENT)
Dept: FAMILY MEDICINE CLINIC | Facility: CLINIC | Age: 65
End: 2025-03-04

## 2025-03-04 ENCOUNTER — OFFICE VISIT (OUTPATIENT)
Dept: OBGYN CLINIC | Facility: MEDICAL CENTER | Age: 65
End: 2025-03-04
Payer: MEDICARE

## 2025-03-04 VITALS — WEIGHT: 154 LBS | BODY MASS INDEX: 25.66 KG/M2 | HEIGHT: 65 IN

## 2025-03-04 DIAGNOSIS — M25.511 CHRONIC RIGHT SHOULDER PAIN: ICD-10-CM

## 2025-03-04 DIAGNOSIS — M54.12 RADICULOPATHY, CERVICAL REGION: ICD-10-CM

## 2025-03-04 DIAGNOSIS — G89.29 CHRONIC RIGHT SHOULDER PAIN: Primary | ICD-10-CM

## 2025-03-04 DIAGNOSIS — M75.81 TENDINITIS OF RIGHT ROTATOR CUFF: ICD-10-CM

## 2025-03-04 DIAGNOSIS — R20.2 NUMBNESS AND TINGLING IN RIGHT HAND: ICD-10-CM

## 2025-03-04 DIAGNOSIS — M50.30 DEGENERATIVE CERVICAL DISC: ICD-10-CM

## 2025-03-04 DIAGNOSIS — M25.511 CHRONIC RIGHT SHOULDER PAIN: Primary | ICD-10-CM

## 2025-03-04 DIAGNOSIS — G89.29 CHRONIC RIGHT SHOULDER PAIN: ICD-10-CM

## 2025-03-04 DIAGNOSIS — R20.0 NUMBNESS AND TINGLING IN RIGHT HAND: ICD-10-CM

## 2025-03-04 DIAGNOSIS — M54.2 NECK PAIN: ICD-10-CM

## 2025-03-04 PROCEDURE — 99214 OFFICE O/P EST MOD 30 MIN: CPT | Performed by: EMERGENCY MEDICINE

## 2025-03-04 PROCEDURE — 72040 X-RAY EXAM NECK SPINE 2-3 VW: CPT

## 2025-03-04 NOTE — LETTER
March 4, 2025     Patient: Clifford Blum  YOB: 1960  Date of Visit: 3/4/2025      To Whom it May Concern:    Clifford Blum is under my professional care. Clifford was seen in my office on 3/4/2025.  Work excuse until further notice.  F/U 6 weeks.      If you have any questions or concerns, please don't hesitate to call.         Sincerely,          Prosper Han MD        CC: No Recipients

## 2025-03-04 NOTE — PROGRESS NOTES
Assessment/Plan:    Diagnoses and all orders for this visit:    Chronic right shoulder pain  -     XR spine cervical 2 or 3 vw injury; Future  -     Ambulatory Referral to Orthopedic Surgery; Future  -     Ambulatory Referral to Physical Therapy; Future  -     MRI cervical spine wo contrast; Future    Tendinitis of right rotator cuff  -     XR spine cervical 2 or 3 vw injury; Future  -     Ambulatory Referral to Orthopedic Surgery; Future  -     Ambulatory Referral to Physical Therapy; Future    Neck pain  -     XR spine cervical 2 or 3 vw injury; Future  -     Ambulatory Referral to Physical Therapy; Future  -     Ambulatory referral to Spine & Pain Management; Future  -     MRI cervical spine wo contrast; Future    Radiculopathy, cervical region  -     XR spine cervical 2 or 3 vw injury; Future  -     Ambulatory Referral to Physical Therapy; Future  -     Ambulatory referral to Spine & Pain Management; Future  -     MRI cervical spine wo contrast; Future    Numbness and tingling in right hand  -     XR spine cervical 2 or 3 vw injury; Future  -     Cock Up Wrist Splint  -     Ambulatory Referral to Physical Therapy; Future  -     MRI cervical spine wo contrast; Future    Degenerative cervical disc  -     Ambulatory Referral to Physical Therapy; Future  -     Ambulatory referral to Spine & Pain Management; Future  -     MRI cervical spine wo contrast; Ayaan Horton received good improvement from first SA CSI however only had mild improvement from repeat CSI.  MRI Shoulder WNL, today decreased ROM and + impingement sign   S/p PT.  [facility] consult  S/p Medrol pack 5/2024  X-ray cervical spine obtained today showing DDD, refer for MRI C spine and to Pain Management given chronicity of symptoms with no significant benefit from recent right SA CSI  Continue IBU and Tylenol per PCP    Return in about 6 weeks (around 4/15/2025).      Subjective:   Patient ID: Clifford Blum is a 65 y.o.  male.    Patient returns s/p repeat R SA CSI last eval 11/2024 with only mild improvement.  He notes continued pain of the right sided UT, neck , shoulder and UE pain worse at night time, also notes cramping of the right hand.  Started PT, taking IBU and Tylenol    Previous note:  Clifford returns having undergone MRI Right Shoulder.  He's experiencing right shoulder pain, but also radiates into elbow and forearm.  N/t forearm.      Previous note:  Patient returns having had significant improvement with SA CSI 98% improvement however his pain is returning.  It seems to be worse at night.  Limited ROM right shoulder.    Notes minimal symptoms Left shoulder  Taking Acetaminophen, has Diclofenac gel     Initial note:  NP presents for 8 months of Right shoulder pain.   Pt states that he is  and was carrying 75 lb boxes when pain began. Pt states that he has significant increase in pain and reduction in ROM.  Pain worse at night  He was prescribed MEDROL pack, provided Toradol IM, participating in PT          Review of Systems    The following portions of the patient's chart were reviewed and updated as appropriate:   Allergy:  No Known Allergies    Medications:    Current Outpatient Medications:     acetaminophen (TYLENOL) 650 mg CR tablet, Take 1 tablet (650 mg total) by mouth every 8 (eight) hours as needed for mild pain, Disp: 30 tablet, Rfl: 4    Diclofenac Sodium (VOLTAREN) 1 %, Apply 2 g topically 4 (four) times a day, Disp: 100 g, Rfl: 2    lisinopril (ZESTRIL) 40 mg tablet, Take 1 tablet (40 mg total) by mouth daily, Disp: 90 tablet, Rfl: 0    pantoprazole (PROTONIX) 40 mg tablet, Take 1 tablet (40 mg total) by mouth daily, Disp: 90 tablet, Rfl: 1    rosuvastatin (CRESTOR) 10 MG tablet, Take 1 tablet (10 mg total) by mouth daily, Disp: 90 tablet, Rfl: 1    Current Facility-Administered Medications:     ropivacaine (NAROPIN) injection 3 mL, 3 mL, Intra-articular, Titrated, , 3 mL at 07/10/24  "0900    Patient Active Problem List   Diagnosis    Essential hypertension    Mixed hyperlipidemia    Cervicalgia    Macrocytic anemia    Stage 2 chronic kidney disease    Chronic right shoulder pain    Gastroesophageal reflux disease    Elbow effusion, right    Platelets decreased (HCC)    Numbness and tingling in right hand       Objective:  Ht 5' 5\" (1.651 m)   Wt 69.9 kg (154 lb)   BMI 25.63 kg/m²     Back Exam     Comments:  C spine limited ROM with pain      Right Shoulder Exam     Range of Motion   Active abduction:  abnormal   External rotation:  abnormal   Internal rotation 0 degrees:  abnormal     Tests   Impingement: positive  Drop arm: negative            Physical Exam      Neurologic Exam    Procedures    X-ray cervical spine obtained today showing slight reversal of the normal curvature there are degenerative disc seen at the midportion of the cervical spine no acute findings such as fracture      I have personally reviewed the written report of the pertinent studies.     MRI Right Shoulder  IMPRESSION:     1. Mild supraspinatus and infraspinatus tendinosis without tear.     2. Mild glenohumeral and acromioclavicular osteoarthritis.            Past Medical History:   Diagnosis Date    Anemia     Chronic kidney disease     Colon polyp     Hyperlipidemia     Hypertension     Suprapubic tenderness 06/22/2023       History reviewed. No pertinent surgical history.    Social History     Socioeconomic History    Marital status: /Civil Union     Spouse name: Not on file    Number of children: Not on file    Years of education: Not on file    Highest education level: Not on file   Occupational History    Not on file   Tobacco Use    Smoking status: Never     Passive exposure: Never    Smokeless tobacco: Never   Vaping Use    Vaping status: Never Used   Substance and Sexual Activity    Alcohol use: Yes     Comment: Social    Drug use: Never    Sexual activity: Not on file   Other Topics Concern    Not on " file   Social History Narrative    Not on file     Social Drivers of Health     Financial Resource Strain: Medium Risk (5/29/2024)    Overall Financial Resource Strain (CARDIA)     Difficulty of Paying Living Expenses: Somewhat hard   Food Insecurity: Food Insecurity Present (5/29/2024)    Nursing - Inadequate Food Risk Classification     Worried About Running Out of Food in the Last Year: Often true     Ran Out of Food in the Last Year: Often true     Ran Out of Food in the Last Year: Not on file   Transportation Needs: Unmet Transportation Needs (5/29/2024)    PRAPARE - Transportation     Lack of Transportation (Medical): Yes     Lack of Transportation (Non-Medical): Yes   Physical Activity: Not on file   Stress: Not on file   Social Connections: Not on file   Intimate Partner Violence: Not on file   Housing Stability: Low Risk  (5/29/2024)    Housing Stability Vital Sign     Unable to Pay for Housing in the Last Year: No     Number of Times Moved in the Last Year: 1     Homeless in the Last Year: No       History reviewed. No pertinent family history.

## 2025-03-04 NOTE — PROGRESS NOTES
JOSE G STAFFORD received a new referral from provider regarding patient needs assistance in applying for disability. After chart review JOSE G TSAFFORD did place a call to Pt to assist as needed. JOSE G STAFFORD introduced herself, her role and explained reason for call in Amharic. Pt seems understanding, he inquired if he can return the call since is driving and can not hear well. JOSE G STAFFORD informed Pt that he can return the call at his convenience.     JOSE G STAFFORD did receive a returned call from Pt. Pt stated that he wants to apply for SSI. JOSE G STAFFORD explained Pt that she can not assist him in SSI process however she encouraged Pt to go to the Social Security Office.     Also JOSE G ARAIZA CM inquired Pt if there is any other social needs that Pt needs assistance with. Pt denied other social needs at this time. JOSE G STAFFORD provided Pt with JOSE G STAFFORD phone number and name. Pt is aware that he can reach out the JOSE G STAFFORD for further support Monday through Friday within office hours. Pt seems understanding and thankful for JOSE G STAFFORD support.     JOSE G STAFFORD is closing this referral today due to Pt denied any social needs at this time.  JOSE G STAFFORD is remain available for further assistance as needed.

## 2025-03-13 ENCOUNTER — APPOINTMENT (OUTPATIENT)
Dept: PHYSICAL THERAPY | Facility: CLINIC | Age: 65
End: 2025-03-13
Payer: MEDICARE

## 2025-03-14 ENCOUNTER — OFFICE VISIT (OUTPATIENT)
Dept: PHYSICAL THERAPY | Facility: CLINIC | Age: 65
End: 2025-03-14
Payer: MEDICARE

## 2025-03-14 ENCOUNTER — OFFICE VISIT (OUTPATIENT)
Age: 65
End: 2025-03-14
Payer: MEDICARE

## 2025-03-14 DIAGNOSIS — M25.511 CHRONIC RIGHT SHOULDER PAIN: Primary | ICD-10-CM

## 2025-03-14 DIAGNOSIS — G89.29 CHRONIC RIGHT SHOULDER PAIN: ICD-10-CM

## 2025-03-14 DIAGNOSIS — M25.511 CHRONIC RIGHT SHOULDER PAIN: ICD-10-CM

## 2025-03-14 DIAGNOSIS — G89.29 CHRONIC RIGHT SHOULDER PAIN: Primary | ICD-10-CM

## 2025-03-14 DIAGNOSIS — M75.81 TENDINITIS OF RIGHT ROTATOR CUFF: Primary | ICD-10-CM

## 2025-03-14 PROCEDURE — 97140 MANUAL THERAPY 1/> REGIONS: CPT | Performed by: PHYSICAL THERAPIST

## 2025-03-14 PROCEDURE — 97110 THERAPEUTIC EXERCISES: CPT

## 2025-03-14 PROCEDURE — 99214 OFFICE O/P EST MOD 30 MIN: CPT | Performed by: ORTHOPAEDIC SURGERY

## 2025-03-14 PROCEDURE — 97112 NEUROMUSCULAR REEDUCATION: CPT

## 2025-03-14 NOTE — PROGRESS NOTES
"Daily Note     Today's date: 3/14/2025  Patient name: Clifford Blum  : 1960  MRN: [id number]  Referring provider: Rebeka Lei, *  Dx:   Encounter Diagnosis     ICD-10-CM    1. Chronic right shoulder pain  M25.511     G89.29                      Subjective: Pt presents to PT reporting pain in R shoulder grading the pain a 7/10 today.  Pt presents with brace on R wrist.        Objective: See treatment diary below      Assessment: Pt was given brace for R writs on 3/4/25. Brace was on incorrectly; educated pt on proper position. [Per DPT ND - Patient experienced pain along scapular neck and RC humeral insertion during R shoulder PROM tolerating about 95 degrees of flexion and 90 degrees of abd with pain. Patient exhibits significant muscle guarding during PROM and may benefit from modalities to decrease guarding and improve exercise tolerance. Patient experienced tenderness and hypertonicity of R UT with some improvement post manuals.] Educated and demonstrated to pt about using a rolled towel or pillows to support R shoulder when sleeping: pt reports a verbal understanding,   Patient demonstrated fatigue post treatment, exhibited good technique with therapeutic exercises, and would benefit from continued PT to increase flexibility, strength and function.      Plan: Continue per plan of care.      Precautions: Kinyarwanda speaking, CKD      Date 3/3 3/14           Visit # IE 2           FOTO IE             Re-eval IE                 Manuals 3/3 3/14           GH PROM ND ND           Scapular PROM ND R ND R           Cervical PROM ND ND           UT/LS str and STM ND ND           Neuro Re-Ed 3/3 3/14           Median nerve glides 10x R 10x R           No moneys 10x PTB 15x PTB           Rows             AAROM flex/abd/ER nv nv           Resisted ER/IR             Chin tucks 10x5\"            Scap retract 10x5\" 10x5\"           Prone T's/I's/Y's             Wall slides             Ther Ex 3/3 3/14         " "  Shawna nv 5'           UBE             Tables slides nv Flex/ abd/ER 5\" x 10 ea           UT/Ls str nv            Pec str nv                                                   Ther Activity 3/3 3/14                                       Gait Training 3/3 3/14                                     Modalities 3/3 3/14           MHP             Cold pack                  "

## 2025-03-14 NOTE — PROGRESS NOTES
:  Assessment & Plan  Tendinitis of right rotator cuff  Discussed non-operative treatments  Referral to PT given at today's visit  Discussed cortisone injection, patient deferred at this time, Discussed we can do a cortisone injection in the future if pain does not improve with physical therapy  OTC medications as needed  Follow up in 6 weeks    Orders:    Ambulatory Referral to Orthopedic Surgery    Ambulatory Referral to Physical Therapy; Future        Official  used for today's visit      REASON FOR VISIT  Chief Complaint   Patient presents with    Right Shoulder - Pain     Pain started about 10 months ago.       HISTORY OF PRESENT ILLNESS:  Clifford Blum is a 65 y.o. year old right hand dominant male who presents with RIGHT shoulder pain.  No injury to the shoulder  Pain for 9-10 months  Works construction  Decreased ROM due to pain  Reports radiating pain to elbow  Taking Ibuprofen and tylenol  Has done PT with benefits    Had prior injection with Dr. Han, perhaps mild relief       Past Medical and Surgical History:  Past Medical History:   Diagnosis Date    Anemia     Chronic kidney disease     Colon polyp     Hyperlipidemia     Hypertension     Suprapubic tenderness 06/22/2023       History reviewed. No pertinent surgical history.    Medications:    Current Outpatient Medications:     acetaminophen (TYLENOL) 650 mg CR tablet, Take 1 tablet (650 mg total) by mouth every 8 (eight) hours as needed for mild pain, Disp: 30 tablet, Rfl: 4    Diclofenac Sodium (VOLTAREN) 1 %, Apply 2 g topically 4 (four) times a day, Disp: 100 g, Rfl: 2    lisinopril (ZESTRIL) 40 mg tablet, Take 1 tablet (40 mg total) by mouth daily, Disp: 90 tablet, Rfl: 0    pantoprazole (PROTONIX) 40 mg tablet, Take 1 tablet (40 mg total) by mouth daily, Disp: 90 tablet, Rfl: 1    rosuvastatin (CRESTOR) 10 MG tablet, Take 1 tablet (10 mg total) by mouth daily, Disp: 90 tablet, Rfl: 1    Current Facility-Administered Medications:      ropivacaine (NAROPIN) injection 3 mL, 3 mL, Intra-articular, Titrated, , 3 mL at 07/10/24 0900    Allergies:  No Known Allergies      PE:  Pertinent Positive Findings in shoulder exam:    Motion (AROM-PROM):    Forward Flexion R: 160 L : 160   Abduction: R: 140 L: 140   External Rotation: R: 60 L : 60  Internal rotation Adduction: R: T10 L: T10  ABduction/ER: Right 80@ 90 degrees, Left: 80@ 90 degrees   Abduction/lR: Right 80@ 90 degrees, Left: 80@ 90 degrees      RIGHT shoulder:    Supraspinatus strength 5/5   lnfraspinatus strength 5/5   Tenderness [x] AC joint [x] Biceps Groove [] None   Cross body adduction [] Positive [x] Negative   Byrd' [x] Positive [] Negative   Neer's [] Positive [x] Negative   Empty Can [x] Positive [] Negative   ER lag [] Positive [x] Negative   Horn blower's [] Positive [x] Negative   Belly Press [] Positive [x] Negative   Lift Off [] Positive [x] Negative   Bear Hug [] Positive [x] Negative   Gilchrist's sign [x] Positive [] Negative   Speed's sign [x] Positive [] Negative         Sensory: Intact sensation in axillary, lateral antebrachial cutaneous, median, superficial branch radial, and ulnar nerve distributions.    Vascular exam: warm and well perfused digits. Skin: intact, no rashes or lesions.      Radiology: I independently reviewed and interpreted the images.  Radiographs: RIGHT shoulder X-Ray: early arthritis, preserved joints apce      MRI RIGHT shoulder 1/0/24/24  1. Mild supraspinatus and infraspinatus tendinosis without tear.     2. Mild glenohumeral and acromioclavicular osteoarthritis.         CC:  Abby Hilton, Prosper Enciso MD     Scribe Attestation      I,:  Santiago Fuller am acting as a scribe while in the presence of the attending physician.:       I,:  David Gill MD personally performed the services described in this documentation    as scribed in my presence.:

## 2025-03-17 ENCOUNTER — TELEPHONE (OUTPATIENT)
Age: 65
End: 2025-03-17

## 2025-03-17 NOTE — TELEPHONE ENCOUNTER
Caller: Patient     Doctor: Dr. Gill     Reason for call: Requesting a letter stating he is not able to work, for disability.   Please call patient when complete so he can  in office     Call back#: [phone]

## 2025-03-18 ENCOUNTER — EVALUATION (OUTPATIENT)
Dept: PHYSICAL THERAPY | Facility: CLINIC | Age: 65
End: 2025-03-18
Payer: MEDICARE

## 2025-03-18 DIAGNOSIS — G89.29 CHRONIC RIGHT SHOULDER PAIN: Primary | ICD-10-CM

## 2025-03-18 DIAGNOSIS — M25.511 CHRONIC RIGHT SHOULDER PAIN: Primary | ICD-10-CM

## 2025-03-18 PROCEDURE — 97140 MANUAL THERAPY 1/> REGIONS: CPT | Performed by: PHYSICAL THERAPIST

## 2025-03-18 PROCEDURE — 97112 NEUROMUSCULAR REEDUCATION: CPT | Performed by: PHYSICAL THERAPIST

## 2025-03-18 PROCEDURE — 97164 PT RE-EVAL EST PLAN CARE: CPT | Performed by: PHYSICAL THERAPIST

## 2025-03-18 NOTE — PROGRESS NOTES
PT Evaluation     Today's date: 3/18/2025  Patient name: Clifford Blum  : 1960  MRN: [id number]  Referring provider: Rebeka Lei, *  Dx:   Encounter Diagnosis     ICD-10-CM    1. Chronic right shoulder pain  M25.511     G89.29             Start Time: 920  Stop Time: 1000  Total time in clinic (min): 40 minutes    Assessment  Impairments: abnormal coordination, abnormal muscle firing, abnormal muscle tone, abnormal or restricted ROM, abnormal movement, impaired physical strength, lacks appropriate home exercise program, pain with function, poor posture , unable to perform ADL, participation limitations, activity limitations and endurance  Symptom irritability: moderate    Assessment details: Pt is a 65 y.o. year old male presenting to physical therapy for chronic right shoulder pain. He present with the following impairments: limited shoulder ROM R>L, pain with movement, UT hypertonicity, decreased  strength, R UE weakness, limited cervical ROM, hypomobile cervical spine and scapular mobility, TTP: R UT, deltoid, bicep tendon and groove, RC insertion, R rhomboids, (+) R median nerve ULTT, spurlings b/l, R Hawkin's Malachi, R painful arc, R speeds test, R full/empty can affecting their function with reaching overhead, carrying items, sleeping, and working.  Evaluation findings and imaging indicate potential RC tendonopathy, R GH OA, and R cervical radiculopathy. Pt will benefit from skilled physical therapy to address functional limitations noted in evaluation and meet patient goals.    3/18/25: Re-evaluation completed to add neck per patient request. Patient demonstrates improvement in irritability levels, R shoulder AROM and PROM into flex and abd and continues to have limitations in R shoulder ROM, R UE strength, cervical paraspinal and R UT hypertonicity and TTP, UT compensation, R UE weakness, functional IR/ER, (+) R spurlings. Cervical exam findings consisting of (+) R spurlings and  "cervical hypertonicity are likely secondary to R shoulder presentation, pt would benefit from continued skilled PT with original POC working on R shoulder ROM and scapular stability to improve symptoms and improve function with lifting, reaching overhead, reaching BTB and BTH, sleeping, and working. Patient may also benefit from stretching of UT to relieve associated symptoms.            Understanding of Dx/Px/POC: good     Prognosis: good    Goals  STG  1.Patient will demonstrate independence with HEP  2. Patient will improve R shoulder flexion AROM to 120 deg to improve ability to reach overhead  3. Patient will improve pain levels to 4/10 at worst  LTG  1.Patient will meet expected FOTO score  2.Patient will be able to carry a 25# box without increase in pain to improve ability to complete ADLs and work responsiblities  3. Patient will be able to drive for 30 minutes without shoulder pain    Plan  Patient would benefit from: PT eval and skilled physical therapy  Planned modality interventions: TENS, cryotherapy and thermotherapy: hydrocollator packs    Planned therapy interventions: joint mobilization, postural training, patient/caregiver education, neuromuscular re-education, nerve gliding, massage, manual therapy, stretching, therapeutic activities, therapeutic exercise, functional ROM exercises, flexibility, coordination, strengthening and home exercise program    Frequency: 2x week  Duration in weeks: 8      Subjective Evaluation    History of Present Illness  Mechanism of injury: Patient reports insidious onset R shoulder pain for months. Patient reports pain \"feels like something is walking inside\" of his R arm because the pain travels between shoulder to forearm. Patient reports R neck pain and intermittent tingling in R arm. He reports gross R arm weakness and difficulty extending fingers. Patient also reports dropping items. Patient reports taking tylenol and voltaren with some relief. 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 Recommended discussing applying for disability with physician. Patient reports difficulty sleeping due to pain. Patient denies trauma to shoulder, denies headaches.    3/18/25: Patient reports R-sided neck pain since his R shoulder pain started a few months ago.   Pain  Current pain ratin  At worst pain rating: 10  Quality: sharp  Relieving factors: medications  Aggravating factors: lifting and overhead activity        Objective     Concurrent Complaints  Negative for headaches    Postural Observations  Seated posture: poor  Standing posture: poor    Additional Postural Observation Details  Forward head and rounded shoulders    Observations     Right Shoulder  Positive for spasms.     Palpation   Left   No palpable tenderness to the anterior deltoid, biceps, cervical paraspinals, infraspinatus, levator scapulae, middle deltoid, middle trapezius, posterior deltoid, rhomboids, suboccipitals, supraspinatus, teres major, teres minor and upper trapezius.   Hypertonic in the cervical paraspinals.     Right   Hypertonic in the cervical paraspinals, levator scapulae, rhomboids and upper trapezius.   Tenderness of the anterior deltoid, biceps, cervical paraspinals, infraspinatus, levator scapulae, middle deltoid, middle trapezius, posterior deltoid, rhomboids, suboccipitals and upper trapezius.     Tenderness     Right Shoulder  Tenderness in the biceps tendon (proximal), bicipital groove, infraspinatus tendon and supraspinatus tendon.     Additional Tenderness Details  Patient experienced most tenderness at infraspinatus and supraspinatus insertion on humerus    3/18/25: Patient experienced tenderness most at R AC joint    Active Range of Motion   Cervical/Thoracic Spine       Cervical    Flexion:  with pain Restriction level: minimal  Extension:  Restriction level: moderate  Left lateral flexion:   Restriction level: moderate  Right lateral flexion:  Restriction level moderate  Left rotation:  Restriction level: minimal  Right rotation:  Restriction level: minimal  Left Shoulder   Flexion: WFL  Abduction: WFL    Right Shoulder   Flexion: 82 degrees with pain  Abduction: 92 degrees with pain    Passive Range of Motion   Cervical/Thoracic Spine   Cervical     Flexion (degrees):  WFL and with pain  Left lateral flexion (degrees):  Restriction level: minimal  Right lateral flexion (degrees):  Restriction level: minimal  Left rotation (degrees):  WFL  Right rotation (degrees):  WFL    Right Shoulder   Flexion: 95 degrees with pain  Abduction: 100 degrees with pain  External rotation 45°: WFL  Internal rotation 45°: WFL    Additional Passive Range of Motion Details  Muscle guarding and high irritability limit PROM     Joint Play   Left Shoulder  Joints within functional limits are the cervical spine.     Right Shoulder  Joints within functional limits are the cervical spine.     Hypomobile: C1, C2, C3, C4, C5, C6, C7 and T1     Pain: C1, C2, C3, C4, C5, C6, C7 and T1     Strength/Myotome Testing     Left Shoulder     Planes of Motion   Flexion: 4   Abduction: 4   External rotation at 0°: 4   Internal rotation at 0°: 4     Isolated Muscles   Biceps: 4+   Triceps: 4+     Right Shoulder     Planes of Motion   Flexion: 2+   Abduction: 2+   External rotation at 0°: 3+   Internal rotation at 0°: 3+     Isolated Muscles   Biceps: 3+   Triceps: 3+     Additional Strength Details   strength:  L: 33, 30, 30lb  R: 10, 12, 10lb    Tests   Cervical     Left   Negative Spurling's Test A.     Right   Positive Spurling's Test A.     Left Shoulder   Positive ULTT1.   Negative apprehension, drop arm, empty can, external rotation lag sign, Hawkin's, painful arc and Speed's.     Right Shoulder   Positive empty can, external rotation lag sign, Hawkin's, painful arc, Speed's and ULTT1.   Negative apprehension and drop arm.   Neuro  "Exam:     Headaches   Patient reports headaches: No.                Precautions: Thai speaking, CKD      Date 3/3 3/14 3/18          Visit # IE 2 3          FOTO IE             Re-eval IE  Done to add neck               Manuals 3/3 3/14 3/18          GH PROM ND ND ND           Scapular PROM ND R ND R np          Cervical PROM ND ND ND WFL d/c          UT/LS str and STM ND ND ND R          Neuro Re-Ed 3/3 3/14 3/18          Median nerve glides 10x R 10x R 20x R          No moneys 10x PTB 15x PTB 2x15 PTB          Rows             AAROM flex/abd/ER nv nv 10x5\"           Resisted ER/IR             Chin tucks 10x5\"            Scap retract 10x5\" 10x5\"           Prone T's/I's/Y's             Wall slides             Ther Ex 3/3 3/14 3/18          Pulleys nv 5'           UBE             Tables slides nv Flex/ abd/ER 5\" x 10 ea           UT/Ls str nv            Pec str nv            Finger ladder                                       Ther Activity 3/3 3/14 3/18                                      Gait Training 3/3 3/14 3/18                                    Modalities 3/3 3/14 3/18          MHP             Cold pack                    "

## 2025-03-19 ENCOUNTER — HOSPITAL ENCOUNTER (OUTPATIENT)
Dept: MRI IMAGING | Facility: HOSPITAL | Age: 65
Discharge: HOME | End: 2025-03-19
Attending: EMERGENCY MEDICINE

## 2025-03-19 DIAGNOSIS — M54.2 NECK PAIN: ICD-10-CM

## 2025-03-19 DIAGNOSIS — G89.29 CHRONIC RIGHT SHOULDER PAIN: ICD-10-CM

## 2025-03-19 DIAGNOSIS — M50.30 DEGENERATIVE CERVICAL DISC: ICD-10-CM

## 2025-03-19 DIAGNOSIS — M25.511 CHRONIC RIGHT SHOULDER PAIN: ICD-10-CM

## 2025-03-19 DIAGNOSIS — R20.2 NUMBNESS AND TINGLING IN RIGHT HAND: ICD-10-CM

## 2025-03-19 DIAGNOSIS — R20.0 NUMBNESS AND TINGLING IN RIGHT HAND: ICD-10-CM

## 2025-03-19 DIAGNOSIS — M54.12 RADICULOPATHY, CERVICAL REGION: ICD-10-CM

## 2025-03-24 ENCOUNTER — TELEPHONE (OUTPATIENT)
Age: 65
End: 2025-03-24

## 2025-03-24 NOTE — TELEPHONE ENCOUNTER
Caller: Patient     Doctor/Office: Dr CHEUNG     Call regarding :  Looking to transfer PT locations      Call was transferred to: PT

## 2025-03-25 ENCOUNTER — APPOINTMENT (OUTPATIENT)
Dept: PHYSICAL THERAPY | Facility: CLINIC | Age: 65
End: 2025-03-25
Payer: MEDICARE

## 2025-03-26 ENCOUNTER — OFFICE VISIT (OUTPATIENT)
Dept: PHYSICAL THERAPY | Facility: CLINIC | Age: 65
End: 2025-03-26
Payer: MEDICARE

## 2025-03-26 ENCOUNTER — CONSULT (OUTPATIENT)
Age: 65
End: 2025-03-26
Payer: MEDICARE

## 2025-03-26 VITALS — WEIGHT: 154.1 LBS | BODY MASS INDEX: 25.67 KG/M2 | HEIGHT: 65 IN

## 2025-03-26 DIAGNOSIS — M25.511 CHRONIC RIGHT SHOULDER PAIN: Primary | ICD-10-CM

## 2025-03-26 DIAGNOSIS — G89.29 CHRONIC RIGHT SHOULDER PAIN: ICD-10-CM

## 2025-03-26 DIAGNOSIS — G89.29 CHRONIC RIGHT SHOULDER PAIN: Primary | ICD-10-CM

## 2025-03-26 DIAGNOSIS — M54.12 RADICULOPATHY, CERVICAL REGION: ICD-10-CM

## 2025-03-26 DIAGNOSIS — M54.2 NECK PAIN: ICD-10-CM

## 2025-03-26 DIAGNOSIS — M50.30 DEGENERATIVE CERVICAL DISC: ICD-10-CM

## 2025-03-26 DIAGNOSIS — M25.511 CHRONIC RIGHT SHOULDER PAIN: ICD-10-CM

## 2025-03-26 PROCEDURE — 97110 THERAPEUTIC EXERCISES: CPT

## 2025-03-26 PROCEDURE — 97140 MANUAL THERAPY 1/> REGIONS: CPT

## 2025-03-26 PROCEDURE — 99204 OFFICE O/P NEW MOD 45 MIN: CPT | Performed by: STUDENT IN AN ORGANIZED HEALTH CARE EDUCATION/TRAINING PROGRAM

## 2025-03-26 NOTE — PROGRESS NOTES
Assessment:  1. Neck pain    2. Radiculopathy, cervical region    3. Degenerative cervical disc    4. Chronic right shoulder pain        Plan:    Clifford Blum is a 65 y.o. male who presents for evaluation of neck pain, right shoulder pain with radiation to elbow and numbness tingling in right hand secondary to rotator cuff tendinitis and possible cervical radiculitis. We discussed imaging, rehabilitation, optimization of medications and potential interventions. The following plan was formulated with the patient:    - Continue PT as previously prescribed  - MRI Cervical spine w/o contrast pending   - Discussed Gabapentin as possible option however patient defers at this time  - C/w Tylenol PRN  - Voltaren gel   - Follow up after completion of MRI    No orders of the defined types were placed in this encounter.      New Medications Ordered This Visit   Medications    Diclofenac Sodium (VOLTAREN) 1 %     Sig: Apply 2 g topically 4 (four) times a day     Dispense:  100 g     Refill:  2       My impressions and treatment recommendations were discussed in detail with the patient, who verbalized understanding and had no further questions.    History of Present Illness:    Referred by: Prosper Han MD     Clifford Blum is a 65 y.o. male who presents for initial evaluation of neck pain with associated numbness and tingling of the right forearm. Pain started 1 years ago and is not associated with any inciting event or trauma.  He describes the pain as cramping, dull/aching of moderate severity. Pain is rated 8/10 and interferes with daily activities. This pain is nearly constant. Exacerbating factors include bending.     He  was seen by Dr. Han and Dr. Gill for right rotator cuff tendinitis. Referred to PT.  He is pending MRI cervical spine without contrast  Tried Medrol pack May 2024, Toradol  Got 2 SA CSI (Initially with 98% improvement); 2nd one with mild relief  Saw Dr. Gill for tendonitis, given referral to PT  and he denied cortisone injection    Diagnostic studies include x-rays C-spine with DDD. I have personally reviewed pertinent films in PACS.    Pain is causing significant functional limitation resulting in diminished quality of life and impaired age appropriate ADLs.  Denies fever, chills, bowel/bladder dysfunction, motor weakness.    I have personally reviewed and/or updated the patient's past medical history, past surgical history, family history, social history, current medications, allergies, and vital signs today.     Review of Systems:    Review of Systems   Constitutional:  Negative for fever and unexpected weight change.   HENT:  Negative for trouble swallowing.    Eyes:  Negative for visual disturbance.   Respiratory:  Negative for shortness of breath and wheezing.    Cardiovascular:  Negative for chest pain and palpitations.   Gastrointestinal:  Negative for constipation, diarrhea and nausea.   Endocrine: Negative for cold intolerance and heat intolerance.   Genitourinary:  Negative for difficulty urinating and frequency.   Musculoskeletal:  Positive for arthralgias, myalgias, neck pain and neck stiffness. Negative for gait problem and joint swelling.   Skin:  Negative for rash.   Neurological:  Positive for weakness and numbness. Negative for dizziness, syncope and headaches.   Hematological:  Does not bruise/bleed easily.   Psychiatric/Behavioral:  Negative for dysphoric mood.        Past Medical History:   Diagnosis Date    Anemia     Chronic kidney disease     Colon polyp     Hyperlipidemia     Hypertension     Suprapubic tenderness 06/22/2023       No past surgical history on file.    No family history on file.    Social History     Occupational History    Not on file   Tobacco Use    Smoking status: Never     Passive exposure: Never    Smokeless tobacco: Never   Vaping Use    Vaping status: Never Used   Substance and Sexual Activity    Alcohol use: Yes     Comment: Social    Drug use: Never     "Sexual activity: Not on file         Current Outpatient Medications:     acetaminophen (TYLENOL) 650 mg CR tablet, Take 1 tablet (650 mg total) by mouth every 8 (eight) hours as needed for mild pain, Disp: 30 tablet, Rfl: 4    Diclofenac Sodium (VOLTAREN) 1 %, Apply 2 g topically 4 (four) times a day, Disp: 100 g, Rfl: 2    lisinopril (ZESTRIL) 40 mg tablet, Take 1 tablet (40 mg total) by mouth daily, Disp: 90 tablet, Rfl: 0    pantoprazole (PROTONIX) 40 mg tablet, Take 1 tablet (40 mg total) by mouth daily, Disp: 90 tablet, Rfl: 1    rosuvastatin (CRESTOR) 10 MG tablet, Take 1 tablet (10 mg total) by mouth daily, Disp: 90 tablet, Rfl: 1    Current Facility-Administered Medications:     ropivacaine (NAROPIN) injection 3 mL, 3 mL, Intra-articular, Titrated, , 3 mL at 07/10/24 0900    No Known Allergies    Physical Exam:  Ht 5' 5\" (1.651 m)   Wt 69.9 kg (154 lb 1.6 oz)   BMI 25.64 kg/m²     Constitutional: normal, well developed, well nourished, alert, in no distress and non-toxic and no overt pain behavior.  HEENT: grossly intact  Neck: supple, symmetric, trachea midline and no masses   Pulmonary:even and unlabored  Cardiovascular:No edema or pitting edema present  Skin:Normal without rashes or lesions and well hydrated  Psychiatric:Mood and affect appropriate  Neurologic:Cranial Nerves II-XII grossly intact    Cervical Musculoskeletal and Neurologic Exam:    Inspection: no atrophy of the UE musculature    Gait: non-antalgic    ROM: limited AROM of the cervical spine with lateral rotation to right  Sensation: intact and symmetric to light touch in bilateral C5-T1 dermatomes    Strength:       Right Left  Shoulder Abduction 5 5  Elbow Flexion  5 5  Wrist Extension  5 5  Elbow Extension 5 5  Hand   5 5  5th Digit Abduction 5 5      Reflexes: 2+ in bilateral biceps, and triceps. Velez's negative bilaterally    Palpation: TTP cervical paraspinals and trapezius reproducing referred pain consistent with trigger " points    Provocative tests:     Kemps (cervical extension and rotation): negative bilaterally  Spurling's: negative bilaterally     Byrd, Empty can positive on right    Imaging  Xray Cervical Spine 3/4/25  No acute fracture or subluxation.    Anatomic alignment.   There is moderate disc space narrowing and osteophytosis at C4-C5 and C5-C6. There is mild facet hypertrophy as well in the mid to lower cervical spine.    Normal prevertebral soft tissues.     Clear lung apices.   IMPRESSION: Moderate multilevel spondylosis worse at C4-C5 and C5-6. Mild facet disease     MRI RIGHT shoulder 1/0/24/24  1. Mild supraspinatus and infraspinatus tendinosis without tear.   2. Mild glenohumeral and acromioclavicular osteoarthritis.

## 2025-03-26 NOTE — PROGRESS NOTES
"Daily Note     Today's date: 3/26/2025  Patient name: Clifford Blum  : 1960  MRN: [id number]  Referring provider: Rebeka Lei, *  Dx:   Encounter Diagnosis     ICD-10-CM    1. Chronic right shoulder pain  M25.511     G89.29           Start Time: 0813  Stop Time: 0840  Total time in clinic (min): 27 minutes    Subjective: Pt presents to PT reporting a little less pain grading the pain a 7/10.  Pt denies increased pain post PT session.  Pt arrived 10 mins late and also needs to leave early for a 9 a.m. MD appt.      Objective: See treatment diary below      Assessment: Tolerated treatment well. Noted muscles restrictions in R UT and CS while performing manual therapy.  Patient demonstrated fatigue post treatment, exhibited good technique with therapeutic exercises, and would benefit from continued PT to increase flexibility, strength and function.      Plan: Continue per plan of care.      Precautions: Sinhala speaking, CKD      Date 3/3 3/14 3/18 3/26         Visit # IE 2 3 4         FOTO IE             Re-eval IE  Done to add neck               Manuals 3/3 3/14 3/18 3/26         GH PROM ND ND ND  Np          Scapular PROM ND R ND R np            Cervical PROM ND ND ND WFL d/c          UT/LS str and STM ND ND ND R PK         Neuro Re-Ed 3/3 3/14 3/18 3/26         Median nerve glides 10x R 10x R 20x R 20x R         No moneys 10x PTB 15x PTB 2x15 PTB 2x15 PTB         Rows             AAROM flex/abd/ER nv nv 10x5\"           Resisted ER/IR             Chin tucks 10x5\"   5\" x10         Scap retract 10x5\" 10x5\"           Prone T's/I's/Y's             Wall slides             Ther Ex 3/3 3/14 3/18 3/26         Pulleys nv 5'           UBE               Tables slides nv Flex/ abd/ER 5\" x 10 ea           UT/Ls str nv            Pec str nv            Finger ladder                                       Ther Activity 3/3 3/14 3/18 3/26                                     Gait Training 3/3 3/14 3/18 3/26       "                               Modalities 3/3 3/14 3/18 3/26         MHP             Cold pack

## 2025-03-27 ENCOUNTER — APPOINTMENT (OUTPATIENT)
Dept: PHYSICAL THERAPY | Facility: CLINIC | Age: 65
End: 2025-03-27
Payer: MEDICARE

## 2025-04-07 ENCOUNTER — HOSPITAL ENCOUNTER (OUTPATIENT)
Dept: MRI IMAGING | Facility: CLINIC | Age: 65
Discharge: HOME | End: 2025-04-07
Payer: MEDICARE

## 2025-04-07 DIAGNOSIS — R20.2 NUMBNESS AND TINGLING IN RIGHT HAND: ICD-10-CM

## 2025-04-07 DIAGNOSIS — M54.2 NECK PAIN: ICD-10-CM

## 2025-04-07 DIAGNOSIS — M54.12 RADICULOPATHY, CERVICAL REGION: ICD-10-CM

## 2025-04-07 DIAGNOSIS — M25.511 CHRONIC RIGHT SHOULDER PAIN: ICD-10-CM

## 2025-04-07 DIAGNOSIS — G89.29 CHRONIC RIGHT SHOULDER PAIN: ICD-10-CM

## 2025-04-07 DIAGNOSIS — M50.30 DEGENERATIVE CERVICAL DISC: ICD-10-CM

## 2025-04-07 DIAGNOSIS — R20.0 NUMBNESS AND TINGLING IN RIGHT HAND: ICD-10-CM

## 2025-04-07 PROCEDURE — 72141 MRI NECK SPINE W/O DYE: CPT

## 2025-04-16 ENCOUNTER — OFFICE VISIT (OUTPATIENT)
Age: 65
End: 2025-04-16
Payer: MEDICARE

## 2025-04-16 ENCOUNTER — EVALUATION (OUTPATIENT)
Age: 65
End: 2025-04-16
Payer: MEDICARE

## 2025-04-16 VITALS — WEIGHT: 154.1 LBS | HEIGHT: 65 IN | BODY MASS INDEX: 25.67 KG/M2

## 2025-04-16 DIAGNOSIS — M54.12 CERVICAL RADICULOPATHY: Primary | ICD-10-CM

## 2025-04-16 DIAGNOSIS — M25.511 CHRONIC RIGHT SHOULDER PAIN: ICD-10-CM

## 2025-04-16 DIAGNOSIS — G89.29 CHRONIC RIGHT SHOULDER PAIN: Primary | ICD-10-CM

## 2025-04-16 DIAGNOSIS — M25.511 CHRONIC RIGHT SHOULDER PAIN: Primary | ICD-10-CM

## 2025-04-16 DIAGNOSIS — G89.29 CHRONIC RIGHT SHOULDER PAIN: ICD-10-CM

## 2025-04-16 PROCEDURE — 97161 PT EVAL LOW COMPLEX 20 MIN: CPT

## 2025-04-16 PROCEDURE — 99214 OFFICE O/P EST MOD 30 MIN: CPT | Performed by: STUDENT IN AN ORGANIZED HEALTH CARE EDUCATION/TRAINING PROGRAM

## 2025-04-16 PROCEDURE — 97140 MANUAL THERAPY 1/> REGIONS: CPT

## 2025-04-16 RX ORDER — GABAPENTIN 300 MG/1
CAPSULE ORAL
Qty: 90 CAPSULE | Refills: 1 | Status: SHIPPED | OUTPATIENT
Start: 2025-04-16

## 2025-04-16 NOTE — PROGRESS NOTES
Assessment:  1. Cervical radiculopathy    2. Chronic right shoulder pain        Plan:    Clifford Blum is a 65 y.o. male who presents for follow up office visit in regards to neck pain, right shoulder pain with radiation to elbow and numbness tingling in right hand secondary to cervical radiculopathy and rotator cuff tendinitis. We discussed imaging, rehabilitation, optimization of medications and potential interventions. The following plan was formulated with the patient:    - Gabapentin 300 QHSx3 days, then BIDx3 days, then TID after. Advised to watch for sedative effects and provided up titration calendar.  Counseled not to take medication while driving or operating heavy machinery/using stairs   - I offered a cervical epidural however he declined at this time. Would like trial Gabapentin first  - Follow up in 6-8 weeks     No orders of the defined types were placed in this encounter.      New Medications Ordered This Visit   Medications    gabapentin (NEURONTIN) 300 mg capsule     Sig: QHS x3 days, then BIDx3 days then TID after     Dispense:  90 capsule     Refill:  1     My impressions and treatment recommendations were discussed in detail with the patient, who verbalized understanding and had no further questions.    History of Present Illness:  Clifford Blum is a 65 y.o. male who presents for a follow up office visit in regards to neck pain, right shoulder pain with radiation to elbow and numbness tingling in right hand secondary to rotator cuff tendinitis and possible cervical radiculitis. Since last visit pain has continued . Current pain medications includes:  Tylenol.      He describes the pain as electric shock, cramping, dull/aching of moderate severity. Pain is rated 7/10 and interferes with daily activities. This pain is nearly constant.     Diagnostic studies include MRI Cervical spine w/o contrast (4/7/25) with multilevel disc bulge resulting in mild canal setnosis and foraminal narrowing  (severe right C4-C5 and right C5-C6) I have personally reviewed pertinent films in PACS.    He  was seen by Dr. Han and Dr. Gill for right rotator cuff tendinitis. Referred to PT.  He is pending MRI cervical spine without contrast  Tried Medrol pack May 2024, Toradol  Got 2 SA CSI (Initially with 98% improvement); 2nd one with mild relief  Saw Dr. Gill for tendonitis, given referral to PT and he denied cortisone injection    Pain is causing significant functional limitation resulting in diminished quality of life and impaired age appropriate ADLs.  Denies fever, chills, numbness/tingling, bowel/bladder dysfunction, motor weakness.    I have personally reviewed and/or updated the patient's past medical history, past surgical history, family history, social history, current medications, allergies, and vital signs today.     Review of Systems   Constitutional:  Negative for chills and fever.   HENT:  Negative for ear pain and sore throat.    Eyes:  Negative for pain and visual disturbance.   Respiratory:  Negative for cough and shortness of breath.    Cardiovascular:  Negative for chest pain and palpitations.   Gastrointestinal:  Negative for abdominal pain and vomiting.   Genitourinary:  Negative for dysuria and hematuria.   Musculoskeletal:  Positive for arthralgias and myalgias. Negative for back pain.   Skin:  Negative for color change and rash.   Neurological:  Positive for weakness and numbness. Negative for seizures and syncope.   All other systems reviewed and are negative.      Patient Active Problem List   Diagnosis    Essential hypertension    Mixed hyperlipidemia    Cervicalgia    Macrocytic anemia    Stage 2 chronic kidney disease    Chronic right shoulder pain    Gastroesophageal reflux disease    Elbow effusion, right    Platelets decreased (HCC)    Numbness and tingling in right hand       Past Medical History:   Diagnosis Date    Anemia     Chronic kidney disease     Colon polyp     Hyperlipidemia   "   Hypertension     Suprapubic tenderness 06/22/2023       History reviewed. No pertinent surgical history.    History reviewed. No pertinent family history.    Social History     Occupational History    Not on file   Tobacco Use    Smoking status: Never     Passive exposure: Never    Smokeless tobacco: Never   Vaping Use    Vaping status: Never Used   Substance and Sexual Activity    Alcohol use: Yes     Comment: Social    Drug use: Never    Sexual activity: Not on file       Current Outpatient Medications on File Prior to Visit   Medication Sig    acetaminophen (TYLENOL) 650 mg CR tablet Take 1 tablet (650 mg total) by mouth every 8 (eight) hours as needed for mild pain    Diclofenac Sodium (VOLTAREN) 1 % Apply 2 g topically 4 (four) times a day    lisinopril (ZESTRIL) 40 mg tablet Take 1 tablet (40 mg total) by mouth daily    pantoprazole (PROTONIX) 40 mg tablet Take 1 tablet (40 mg total) by mouth daily    rosuvastatin (CRESTOR) 10 MG tablet Take 1 tablet (10 mg total) by mouth daily     Current Facility-Administered Medications on File Prior to Visit   Medication    ropivacaine (NAROPIN) injection 3 mL       No Known Allergies    Physical Exam:    Ht 5' 5\" (1.651 m)   Wt 69.9 kg (154 lb 1.6 oz)   BMI 25.64 kg/m²     Constitutional: normal, well developed, well nourished, alert, in no distress and non-toxic and no overt pain behavior.  HEENT: grossly intact  Neck: supple, symmetric, trachea midline and no masses   Pulmonary:even and unlabored  Cardiovascular:No edema or pitting edema present  Skin:Normal without rashes or lesions and well hydrated  Psychiatric:Mood and affect appropriate  Neurologic:Cranial Nerves II-XII grossly intact    Cervical Musculoskeletal and Neurologic Exam:                        Inspection: no atrophy of the UE musculature                     Gait: non-antalgic                          ROM: limited AROM of the cervical spine with lateral rotation to right  Sensation: intact and " symmetric to light touch in bilateral C5-T1 dermatomes                       Strength:                                                                  Right     Left  Shoulder [id number]  Elbow Flexion                 5           5  Wrist Extension              5           5  Elbow Extension 5 5  Hand                        5           5  5th Digit Abduction 5 5                              Reflexes: 2+ in bilateral biceps, and triceps. Velez's negative bilaterally                            Palpation: TTP cervical paraspinals and trapezius reproducing referred pain consistent with trigger points                    Provocative tests:                                       Kemps (cervical extension and rotation): negative bilaterally  Spurling's: negative bilaterally                    Byrd, Empty can positive on right    Imaging  Xray Cervical Spine 3/4/25  No acute fracture or subluxation.    Anatomic alignment.   There is moderate disc space narrowing and osteophytosis at C4-C5 and C5-C6. There is mild facet hypertrophy as well in the mid to lower cervical spine.    Normal prevertebral soft tissues.     Clear lung apices.   IMPRESSION: Moderate multilevel spondylosis worse at C4-C5 and C5-6. Mild facet disease     MRI RIGHT shoulder 1/0/24/24  1. Mild supraspinatus and infraspinatus tendinosis without tear.   2. Mild glenohumeral and acromioclavicular osteoarthritis    MRI Cervical Spine 4/7/25   ALIGNMENT: Mild reversal of cervical lordosis. No compression fracture.  No subluxation.  No scoliosis.     MARROW SIGNAL:  Normal marrow signal is identified within the visualized bony structures.  No discrete marrow lesion.     CERVICAL AND VISUALIZED THORACIC CORD:  Normal signal within the visualized cord.     PREVERTEBRAL AND PARASPINAL SOFT TISSUES:  Normal.     VISUALIZED POSTERIOR FOSSA:  The visualized posterior fossa demonstrates no abnormal signal.     CERVICAL DISC SPACES: Multilevel endplate  osteophytes, degenerative endplate changes, disc height loss, uncovertebral hypertrophy.     C1-C2: Atlantodens degenerative changes. No significant canal stenosis.     C2-C3: Small central disc protrusion. Mild canal stenosis. No significant foraminal narrowing.     C3-C4: Mild disc bulge,, tiny central disc protrusion. Uncovertebral hypertrophy. Mild canal stenosis. Mild bilateral foraminal narrowing.     C4-C5: Disc bulge. Uncovertebral hypertrophy. Mild canal stenosis. Severe right and mild left foraminal narrowing.     C5-C6: Disc bulge. Uncovertebral hypertrophy. Mild canal stenosis. Severe right and mild left foraminal narrowing.     C6-C7: Disc bulge. Uncovertebral hypertrophy. Mild canal stenosis. Mild right and moderate left foraminal narrowing.     C7-T1: Mild disc bulge. Hypertrophic right facet arthropathy, ligamentum flavum thickening. Mild canal stenosis. Mild bilateral foraminal narrowing (right worse than left).     UPPER THORACIC DISC SPACES:  Normal.     OTHER FINDINGS:  None.     IMPRESSION:     Multilevel degenerative changes of cervical spine with varying degrees of canal stenosis (multilevel mild) and foraminal narrowing (severe right C4-C5 and right C5-C6), as detailed above.

## 2025-04-16 NOTE — PROGRESS NOTES
PT Evaluation     Today's date: 2025  Patient name: Clifford Blum  : 1960  MRN: [id number]  Referring provider: No ref. provider found  Dx:   Encounter Diagnosis     ICD-10-CM    1. Chronic right shoulder pain  M25.511     G89.29           Start Time: 1002  Stop Time: 1042  Total time in clinic (min): 40 minutes    Assessment  Impairments: abnormal coordination, abnormal muscle firing, abnormal muscle tone, abnormal or restricted ROM, abnormal movement, activity intolerance, difficulty understanding, impaired physical strength, lacks appropriate home exercise program, pain with function, scapular dyskinesis, poor posture , poor body mechanics, participation limitations, activity limitations and endurance  Symptom irritability: high    Assessment details: Pt presents with s/s consistent with cervical radiculopathy of the R UE.  Pt impairments include decreased AROM/PROM of both the c/s and R shoulder, ULTT (+) of the R UE, decreased strength grossly R UE, difficulty with functional movements and OH movements, pain at rest and with sleeping, and decreased participation in work activities.   HEP demonstrated and reviewed with Pt.  Pt would benefit from skilled PT services in order to meet goals and return to PLOF.  Barriers to intervention: ESL  Understanding of Dx/Px/POC: good     Prognosis: good    Goals  Pt will adhere to HEP and demonstrate proper form per treating PT discretion in 2 weeks.    Pt will reduce resting pain levels by 2 points on the VAS in 2 weeks.     Pt will improve AROM by 5-10 degrees of affected limb in 2 weeks.    Pt will improve AROM WNL in 12 weeks of R shoulder and C/s.    Pt will decrease ULTT to (-) in R UE in 6 weeks.    Pt will be able to wash his back in the shower in 8 weeks.    Pt will improve gross UE strength by 1 MMT out of 5 in 8 weeks.       Plan  Patient would benefit from: skilled physical therapy  Referral necessary: No  Planned modality interventions: low level  "laser therapy, thermotherapy: hydrocollator packs and TENS    Planned therapy interventions: IASTM, ADL retraining, joint mobilization, manual therapy, massage, body mechanics training, motor coordination training, nerve gliding, neuromuscular re-education, postural training, patient/caregiver education, coordination, fine motor coordination training, flexibility, functional ROM exercises, strengthening, stretching, therapeutic activities, therapeutic exercise and home exercise program    Frequency: 2x week  Duration in weeks: 12  Plan of Care beginning date: 2025  Plan of Care expiration date: 2025  Treatment plan discussed with: patient and family  Plan details: Pt will participate in skilled PT services 2x/week tapering as needed for 8 weeks in order to return to PLOF and meet goals.      Subjective Evaluation    History of Present Illness  Mechanism of injury: Pt presents into PT with chief complaint R shoulder pain for about a year. 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 Pt feels n/t in the R three fingers. Pt has difficulty with showering, OH lifting, and behind the back. Pt is right handed but has been using L side to function.  Pt has issues turning neck and sleeping. Pt received CSI four months prior with \"not much relief.\"            Recurrent probem    Quality of life: good    Patient Goals  Patient goals for therapy: increased strength, return to work, independence with ADLs/IADLs, decreased pain, increased motion and return to sport/leisure activities  Patient goal: Pt wants to reduce pain and promote function.  Pain  Current pain ratin  At best pain ratin  At worst pain ratin (night time)  Location: R shoulder/arm  Quality: burning and radiating  Relieving factors: heat and medications (sodium diclofenac gel; tylenol " prn)  Aggravating factors: lifting and overhead activity  Progression: improved    Social Support    Employment status: not working    Diagnostic Tests  MRI studies: abnormal  Treatments  Previous treatment: injection treatment, physical therapy and medication      Objective     Postural Observations  Seated posture: poor  Standing posture: poor      Cervical/Thoracic Screen   Cervical range of motion within normal limits with the following exceptions: Decreased AROM in all directions    Active Range of Motion   Left Shoulder   Normal active range of motion    Right Shoulder   Flexion: 90 degrees with pain  Extension: WFL  Abduction: 90 degrees with pain  External rotation BTH: Active external rotation behind the head: 85% limited. with pain  Internal rotation BTB: Active internal rotation behind the back: 85% limited. with pain    Joint Play     Right Shoulder  Hypomobile in the anterior capsule, posterior capsule, inferior capsule, cervical spine and thoracic spine.     Strength/Myotome Testing     Left Shoulder   Normal muscle strength    Right Shoulder     Planes of Motion   Flexion: 2   Extension: 2   Abduction: 2   Adduction: 2   External rotation BTH: 2   Internal rotation BTB: 2     Additional Strength Details  Unable to test strength due to pain    General Comments:      Wrist/Hand Comments  (+) ULTT Median R UE             Precautions: universal    POC expires Unit limit Auth Expiration date PT/OT/ST + Visit Limit?   7/16/25 bomn -- bomn                             Visit/Unit Tracking  AUTH Status:  Date               yes Used                Remaining                      Manuals             PROM  R shoulder             PROM C/S                                       Neuro Re-Ed             Chin tucks             Scapular squeezes                                                                              Ther Ex             Cervical Ext             Shoulder isos                                                                                            Ther Activity             Primo CHINO Flex/Abd             Pulleys             Gait Training                                       Modalities             MH

## 2025-04-30 ENCOUNTER — APPOINTMENT (OUTPATIENT)
Age: 65
End: 2025-04-30
Payer: MEDICARE

## 2025-05-29 ENCOUNTER — APPOINTMENT (OUTPATIENT)
Dept: RADIOLOGY | Facility: CLINIC | Age: 65
End: 2025-05-29
Attending: FAMILY MEDICINE
Payer: MEDICARE

## 2025-05-29 ENCOUNTER — OFFICE VISIT (OUTPATIENT)
Dept: OBGYN CLINIC | Facility: CLINIC | Age: 65
End: 2025-05-29
Payer: MEDICARE

## 2025-05-29 VITALS — WEIGHT: 148 LBS | HEIGHT: 65 IN | BODY MASS INDEX: 24.66 KG/M2

## 2025-05-29 DIAGNOSIS — Z60.3 LANGUAGE BARRIER: ICD-10-CM

## 2025-05-29 DIAGNOSIS — M17.12 PRIMARY OSTEOARTHRITIS OF LEFT KNEE: Primary | ICD-10-CM

## 2025-05-29 DIAGNOSIS — M25.562 ACUTE PAIN OF LEFT KNEE: ICD-10-CM

## 2025-05-29 DIAGNOSIS — Z75.8 LANGUAGE BARRIER: ICD-10-CM

## 2025-05-29 PROCEDURE — 73562 X-RAY EXAM OF KNEE 3: CPT

## 2025-05-29 PROCEDURE — 99214 OFFICE O/P EST MOD 30 MIN: CPT | Performed by: FAMILY MEDICINE

## 2025-05-29 SDOH — SOCIAL STABILITY - SOCIAL INSECURITY: ACCULTURATION DIFFICULTY: Z60.3

## 2025-05-29 NOTE — PROGRESS NOTES
"Name: Clifford Blum      : 1960      MRN: [id number]  Encounter Provider: Neo Shannon DO  Encounter Date: 2025   Encounter department: [facility]  :  Assessment & Plan  Primary osteoarthritis of left knee    > 45 min devoted to review of previous, pertinent medical records, imaging, discussion of treatment options, counseling and documentation  Imaging independently reviewed and discussed with patient.  Degenerative changes noted, no acute fractures appreciated.  Follow-up official reading.  We discussed the nature of knee OA at length and detailed the treatment approach.  Directed to ice the area daily for 20 minutes at a time using a barrier to protect the skin- stressed specifically icing after activity to address inflammation  Discussed role for csi-declined after counseling  Follow up as needed . Should sx's worsen or any concerns arise, they were advised to follow up sooner or seek more immediate medical attention.  All of the patient's concerns were addressed and questions answered. They verbalized agreement with and understanding of the treatment plan.        Orders:    XR knee 3 vw left non injury; Future        History of Present Illness   HPI  Clifford Blum is a 65 y.o. male who presents today for initial evaluation of acute left knee pain.  Patient had had an injury approximately 2 weeks ago when he had fallen directly onto his left knee.  He was seen in the emergency room at [facility], radiographs were obtained revealing advanced degenerative changes and subsequent CT scan was ordered which confirmed degenerative changes and no acute fracture.  Has been taking Tylenol and topical Voltaren gel and does report some pain symptom relief.      History obtained from: patient    Review of Systems  Pertinent Medical History   HTN,ckd2, dld, cervical radiculopathy            Objective   Ht 5' 5\" (1.651 m)   Wt 67.1 kg (148 lb)   BMI " 24.63 kg/m²      Physical Exam      Objective:  General: no acute distress, non toxic, AAO x3   Skin: no skin changes, no rashes, no wounds or laceration  Vasculature: normal cap refill, no LE edema, normal popliteal and dorsalis pedis pulse  Neurologic:   Musculoskeletal: left KNEE EXAM  Gait: limping gait positive, able to weight bear without difficulty  Inspection: No gross deformity, no redness or warmth   Effusion: negative   Medial joint line TTP: +  Lateral joint line TTP: negative  ROM: Full flexion and extension  Mitchell's: negative,   Instability to varus/valgus stress: negative, pain with varus stress  Anterior Drawer: negative   Lachman's test: negative  Posterior Drawer: negative  Crepitus: +    Administrative Statements   I have spent a total time of 45 minutes in caring for this patient on the day of the visit/encounter including Impressions, Counseling / Coordination of care, Documenting in the medical record, Reviewing/placing orders in the medical record (including tests, medications, and/or procedures), and Obtaining or reviewing history  .

## 2025-07-14 ENCOUNTER — TELEPHONE (OUTPATIENT)
Dept: FAMILY MEDICINE CLINIC | Facility: CLINIC | Age: 65
End: 2025-07-14

## 2025-07-14 NOTE — TELEPHONE ENCOUNTER
I have attempted to contact this patient by phone with the following results: left message to return my call on answering machine. Appointment on 7/22 needs to be rescheduled.

## 2025-07-17 NOTE — TELEPHONE ENCOUNTER
I have attempted to contact this patient by phone with the following results: left message to return my call on answering machine.    Letter being sent.